# Patient Record
Sex: FEMALE | Race: WHITE | Employment: UNEMPLOYED | URBAN - METROPOLITAN AREA
[De-identification: names, ages, dates, MRNs, and addresses within clinical notes are randomized per-mention and may not be internally consistent; named-entity substitution may affect disease eponyms.]

---

## 2017-01-19 ENCOUNTER — HOSPITAL ENCOUNTER (OUTPATIENT)
Dept: MAMMOGRAPHY | Age: 52
Discharge: HOME | End: 2017-01-19
Attending: FAMILY MEDICINE
Payer: OTHER GOVERNMENT

## 2017-01-19 DIAGNOSIS — Z12.31 VISIT FOR SCREENING MAMMOGRAM: ICD-10-CM

## 2017-01-19 PROCEDURE — 77067 SCR MAMMO BI INCL CAD: CPT

## 2017-01-20 ENCOUNTER — OFFICE VISIT (OUTPATIENT)
Dept: VASCULAR SURGERY | Age: 52
End: 2017-01-20

## 2017-01-20 VITALS
SYSTOLIC BLOOD PRESSURE: 124 MMHG | BODY MASS INDEX: 19.49 KG/M2 | HEIGHT: 63 IN | RESPIRATION RATE: 18 BRPM | HEART RATE: 76 BPM | WEIGHT: 110 LBS | DIASTOLIC BLOOD PRESSURE: 60 MMHG

## 2017-01-20 DIAGNOSIS — I83.899 VARICOSE VEINS WITH COMPLICATIONS: Primary | ICD-10-CM

## 2017-01-20 NOTE — PROGRESS NOTES
She is here for treatment of dilated progressively enlarging painful varicose veins. Her left leg as she stands throughout the day there is some branch varicosities of the saphenous and and vessel varicosities that become very dilated and purple also painful in nature. It alcohol prep to this area and a foamy sclera to the session of veins that are on the anterior tibial of the mid lower leg left side.   She tolerated this very nicely had a good result will see her back in follow-up if she has any further of these veins were can inject them as well

## 2017-01-20 NOTE — MR AVS SNAPSHOT
Visit Information Date & Time Provider Department Dept. Phone Encounter #  
 1/20/2017 10:30 AM Papito Reynolds, [address] [facility] [phone] [id number] Upcoming Health Maintenance Date Due Hepatitis C Screening 1965 DTaP/Tdap/Td series (1 - Tdap) 3/2/1984 FOBT Q 1 YEAR AGE 50-75 3/2/2013 INFLUENZA AGE 9 TO ADULT 8/1/2016 PAP AKA CERVICAL CYTOLOGY 12/8/2017 BREAST CANCER SCRN MAMMOGRAM 1/18/2018 Allergies as of 1/20/2017  Review Complete On: 1/20/2017 By: Papito Reynolds MD  
  
 Severity Noted Reaction Type Reactions Sulfa Dyne  09/24/2012    Rash, Itching Current Immunizations  Never Reviewed No immunizations on file. Not reviewed this visit You Were Diagnosed With   
  
 Codes Comments Varicose veins with complications    -  Primary ICD-10-CM: V81.291 ICD-9-CM: 454.8 Vitals BP Pulse Resp Height(growth percentile) Weight(growth percentile) BMI  
 124/60 (BP 1 Location: Left arm, BP Patient Position: Sitting) 76 18 5' 3\" (1.6 m) 110 lb (49.9 kg) 19.49 kg/m2 OB Status Smoking Status Menopause Former Smoker BMI and BSA Data Body Mass Index Body Surface Area  
 19.49 kg/m 2 1.49 m 2 Your Updated Medication List  
  
   
This list is accurate as of: 1/20/17  1:12 PM.  Always use your most recent med list.  
  
  
  
  
 ADDERALL 10 mg tablet Generic drug:  dextroamphetamine-amphetamine Take  by mouth. ADVIL 100 mg tablet Generic drug:  ibuprofen Take  by mouth. folic acid 1 mg tablet Commonly known as:  [employer] Take  by mouth daily. HUMIRA 40 mg/0.8 mL injection Generic drug:  adalimumab  
by SubCUTAneous route once. METHOTREXATE SODIUM PO Take  by mouth. oxyCODONE-acetaminophen 5-325 mg per tablet Commonly known as:  PERCOCET Take 1-2 Tabs by mouth every four (4) hours as needed. Max Daily Amount: 12 Tabs. predniSONE 5 mg tablet Commonly known as:  Isabel Gelineau Take  by mouth. venlafaxine 100 mg tablet Commonly known as:  [facility] Take  by mouth.  
  
 zolpidem 5 mg tablet Commonly known as:  AMBIEN Take  by mouth nightly as needed for Sleep. We Performed the Following INJECTION THERAPY VEIN,MULT VEINS [89166 CPT(R)] Please provide this summary of care documentation to your next provider. Your primary care clinician is listed as Tamela Chew. If you have any questions after today's visit, please call [phone].

## 2017-02-14 ENCOUNTER — OFFICE VISIT (OUTPATIENT)
Dept: VASCULAR SURGERY | Age: 52
End: 2017-02-14

## 2017-02-14 VITALS
DIASTOLIC BLOOD PRESSURE: 80 MMHG | HEIGHT: 63 IN | HEART RATE: 80 BPM | SYSTOLIC BLOOD PRESSURE: 120 MMHG | RESPIRATION RATE: 22 BRPM | BODY MASS INDEX: 19.49 KG/M2 | WEIGHT: 110 LBS

## 2017-02-14 DIAGNOSIS — I83.899 VARICOSE VEINS WITH COMPLICATIONS: Primary | ICD-10-CM

## 2017-02-14 DIAGNOSIS — I83.811 VARICOSE VEINS WITH PAIN, RIGHT: ICD-10-CM

## 2017-02-14 RX ORDER — ERGOCALCIFEROL 1.25 MG/1
CAPSULE ORAL
Refills: 0 | COMMUNITY
Start: 2017-01-30 | End: 2017-11-01

## 2017-02-14 RX ORDER — POTASSIUM CHLORIDE 750 MG/1
TABLET, EXTENDED RELEASE ORAL
Refills: 0 | COMMUNITY
Start: 2017-01-20 | End: 2017-11-01 | Stop reason: DRUGHIGH

## 2017-02-14 NOTE — MR AVS SNAPSHOT
Visit Information Date & Time Provider Department Dept. Phone Encounter #  
 2/14/2017  9:45 AM Chris Carreon, [place] [phone] [id number] Your Appointments 3/28/2017 11:30 AM  
INJECTION with MD DEBRA Gonzalez [facility] ([facility]) Appt Note: Injection [address] [address]  
[phone]  
  
   
 [address] [address] 4/18/2017  9:45 AM  
INJECTION with MD DEBRA Gonzalez [facility] ([facility]) Appt Note: injection [address] Upcoming Health Maintenance Date Due Hepatitis C Screening 1965 DTaP/Tdap/Td series (1 - Tdap) 3/2/1984 FOBT Q 1 YEAR AGE 50-75 3/2/2013 INFLUENZA AGE 9 TO ADULT 8/1/2016 PAP AKA CERVICAL CYTOLOGY 12/8/2017 BREAST CANCER SCRN MAMMOGRAM 1/19/2019 Allergies as of 2/14/2017  Review Complete On: 2/14/2017 By: Chris Carreon MD  
  
 Severity Noted Reaction Type Reactions Sulfa Dyne  09/24/2012    Rash, Itching Current Immunizations  Never Reviewed No immunizations on file. Not reviewed this visit You Were Diagnosed With   
  
 Codes Comments Varicose veins with complications    -  Primary ICD-10-CM: T62.217 ICD-9-CM: 454.8 Varicose veins with pain, right     ICD-10-CM: W49.963 ICD-9-CM: 454.8 Vitals BP Pulse Resp Height(growth percentile) Weight(growth percentile) BMI  
 120/80 (BP 1 Location: Left arm, BP Patient Position: Sitting) 80 22 5' 3\" (1.6 m) 110 lb (49.9 kg) 19.49 kg/m2 OB Status Smoking Status Menopause Former Smoker Vitals History BMI and BSA Data Body Mass Index Body Surface Area  
 19.49 kg/m 2 1.49 m 2 Your Updated Medication List  
  
   
This list is accurate as of: 2/14/17 10:53 AM.  Always use your most recent med list.  
  
  
  
 ADDERALL 10 mg tablet Generic drug:  dextroamphetamine-amphetamine Take  by mouth. ADVIL 100 mg tablet Generic drug:  ibuprofen Take  by mouth. folic acid 1 mg tablet Commonly known as:  [employer] Take  by mouth daily. HUMIRA 40 mg/0.8 mL injection Generic drug:  adalimumab  
by SubCUTAneous route once. METHOTREXATE SODIUM PO Take  by mouth. oxyCODONE-acetaminophen 5-325 mg per tablet Commonly known as:  PERCOCET Take 1-2 Tabs by mouth every four (4) hours as needed. Max Daily Amount: 12 Tabs. potassium chloride 10 mEq tablet Commonly known as:  K-DUR, KLOR-CON  
take 1 tablet by mouth once daily ONLY WHEN TAKING FUROSEMIDE  
  
 predniSONE 5 mg tablet Commonly known as:  [facility] Take  by mouth. venlafaxine 100 mg tablet Commonly known as:  [facility] Take  by mouth. VITAMIN D2 50,000 unit capsule Generic drug:  ergocalciferol  
take 1 capsule by mouth every week  
  
 zolpidem 5 mg tablet Commonly known as:  AMBIEN Take  by mouth nightly as needed for Sleep. We Performed the Following INJECTION THERAPY VEIN,MULT VEINS [89730 CPT(R)] Please provide this summary of care documentation to your next provider. Your primary care clinician is listed as Amparo Gregorio. If you have any questions after today's visit, please call [phone].

## 2017-02-14 NOTE — PROGRESS NOTES
She is happy with her injection sclerotherapy she has had to point. He shows me to varicose veins on the right leg one on the inner calf that is an obvious branch varicosity of the saphenous quite dilated and painful to her also a second one on the lateral leg that goes from the lateral calf to crossing the knees become prominent and painful as well as an alcohol prep of both the sites and injected a foamy a sclera appeared to have a nice response. She will follow-up there is several other large branch varicosities on the medial calf like to follow-up for injection as well this is all right leg.

## 2017-03-08 ENCOUNTER — HOSPITAL ENCOUNTER (OUTPATIENT)
Dept: PHYSICAL THERAPY | Age: 52
Discharge: HOME | End: 2017-03-08
Payer: OTHER GOVERNMENT

## 2017-03-08 PROCEDURE — 97016 VASOPNEUMATIC DEVICE THERAPY: CPT

## 2017-03-08 PROCEDURE — 97162 PT EVAL MOD COMPLEX 30 MIN: CPT

## 2017-03-08 PROCEDURE — 97110 THERAPEUTIC EXERCISES: CPT

## 2017-03-08 NOTE — PROGRESS NOTES
PT SHOULDER EVAL AND TREATMENT      Patient Name: Willie Pablo  Date:3/8/2017  : 1965  [x]  Patient  Verified  Payor: KEANU / Plan: Debo Stephen DEPENDENTS / Product Type: Sharri Sanchez /    In time:910  Out time: 1000  Total Treatment Time (min): 50  Visit #: 1 of     Treatment Area: Bilateral shoulder pain [M25.511, M25.512]    SUBJECTIVE  Pain Level (0-10 scale):  (C): 5 (B): 2 (W):  10  Any medication changes, allergies to medications, diagnosis change, or new procedure performed: see summary sheet for update  Subjective functional status/changes:  CHIEF COMPLAINT:  Patient reports with co B shoulder pain and popping, L greater than R starting Oct/2016 insidious onset. Patient had cortisone shots in B shoulder in Dec 2016 with short term relief. Patient had xray of unknown results. Patient has significant  hx of 3x CS surgeries/ fusion (3/2011,2012, 2015)  osteoporosis and RA. Patient is right hand dominant. DEFICITS: ADLS/ putting on deodarant, doing hair, reaching overhead, upper body dressing,  intermittent sleep disruption, driving   OBJECTIVE:   Posture: severe rounded shoulder and forward head  CS ROM - decreased 75% throughout     ROM:                                             AROM                                                              PROM   Left Right  Left Right   Flexion 62 170 Flexion WFL WNL   Extension 50 80 Extension     Scaption/ABD 30 170 Scaptin/ABD     ER @ 0 Degrees 45 60 ER @ 0 Degrees     IR/ADD L5 T8 IR/ADD       Strength:                                                                         L (1-5) R (1-5) Pain   Flexors NT sec to pain on resistnace 3+ _ Yes   [] No   Abductors  3+ [] Yes   [] No   External Rotators  3+ [] Yes   [] No   Internal Rotators  3+ [] Yes   [] No    strength :  Decreased L     Scapulohumoral Control / Rhythm:  Able to eccentrically lower with good control?  Left: [x] Yes   [] No     Right: [x] Yes   [] No    Palpation  Denies tenderness to touch     Other Tests / Comments:   + crossover imp    Manual: NT    Modality (rationale): decrease pain/ inflammation   [x]  VASO 10  Min L shoulder in seated - mod compression, min temp     Patient Education: [x] Established HEP    [x] POT  (minutes) : 8    Pain Level (0-10 scale) post treatment: 4    ASSESSMENT  [x]  See Plan of Care    PLAN  [x]  Upgrade activities as tolerated  [x] Other:_POC   2x8-12    Eligio Man, PT 3/8/2017  6:27 AM    Justification for Eval Code Complexity:  Patient History : CS fusion, severely poor posture   Examination see exam as above   Clinical Presentation: evolving sec to multi system involvement   Clinical Decision Making : FOTO 46/100

## 2017-03-08 NOTE — PROGRESS NOTES
[address] [facility] AT [address] [place], [place]  Phone: [phone] Fax: [phone] / STATEMENT OF MEDICAL NECESSITY FOR PHYSICAL THERAPY SERVICES  Patient Name: Yohana Murrell : 1965   Medical   Diagnosis: Bilateral shoulder pain [M25.511, M25.512] Treatment Diagnosis: B shoulder pain, postural dysfunction/ impingement    Onset Date: Oct 2016     Referral Source: SONA Lind Start of Care [facility]): 3/8/2017   Prior Hospitalization: See medical history Provider #: [id number]   Prior Level of Function: Functional I - mult co morbidities    Comorbidities: CS fusion x 3, osteoporosis, arthritis, anemia   Medications: Verified on Patient Summary List   The Plan of Care and following information is based on the information from the initial evaluation.   ========================================================================  Assessment / key information:  Pt is a 45y.o. year old female who presents with co B shoulder pain and popping, L greater than R starting Oct/2016 insidious onset. Patient had cortisone shots in B shoulder in Dec 2016 with short term relief. Patient had xray of unknown results. Patient has significant hx of 3x CS surgeries/ fusion (3/2011,2012, 2015) osteoporosis and RA. Patient is right hand dominant. Current deficits include: increased pain to 10/10 at worst, severely poor posture with rounded shoulders and fwd head, poor postural strength sec to inc TS kyphosis, CS ROM limitation by grossly 75%,  Shoulder AROM decreased as below (PROM WFL), strength NT sec to pain on resistance for L , R grossly 3+/5 and positive crossover impingement test. Functional deficits include: ADLS/ putting on deodarant, doing hair, reaching overhead, upper body dressing,  intermittent sleep disruption, driving. Home exercise program initiated on initial evaluation to address these deficits.  Pt would benefit from PT to address these deficits for increased functional mobility and QOL. Left Right   Flexion 62 170   Extension 50 80   Scaption/ABD 30 170   ER @ 0 Degrees 45 60   IR/ADD L5 T8     ========================================================================  Eval Complexity: History: MEDIUM  Complexity : 1-2 comorbidities / personal factors will impact the outcome/ POC Exam:HIGH Complexity : 4+ Standardized tests and measures addressing body structure, function, activity limitation and / or participation in recreation  Presentation: MEDIUM Complexity : Evolving with changing characteristics  Clinical Decision Making:MEDIUM Complexity : FOTO score of 26-74Overall Complexity:MEDIUM  Problem List: pain affecting function, decrease ROM, decrease strength, decrease ADL/ functional abilitiies, decrease activity tolerance, decrease flexibility/ joint mobility and other FOTO 46/100   Treatment Plan may include any combination of the following: Therapeutic exercise, Therapeutic activities, Neuromuscular re-education, Physical agent/modality, Gait/balance training, Manual therapy, Aquatic therapy and Patient education  Patient / Family readiness to learn indicated by: asking questions, trying to perform skills and interest  Persons(s) to be included in education: patient (P)  Barriers to Learning/Limitations: None  Measures taken:    Patient Goal (s): \"be able to use my arm \"   Patient self reported health status: good  Rehabilitation Potential: good   Short Term Goals: To be accomplished in  1  weeks:  1. Pt will be independent and compliant with HEP   Long Term Goals: To be accomplished in  8-12  treatments:  1. Patient will increase FOTO score to 65/100 for indications of increased functional mobility. 2.  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 Patient will demo increased L strength to 3/5 shoulder flexion for progression of lifting to OH shelves  4.  Patient will demo negative crossover impingement sign to indicate decreased impingement of RTC with UE activities such as driving   Frequency / Duration:   Patient to be seen  2  times per week for 8-12  treatments:  Patient / Caregiver education and instruction: self care, activity modification and exercises  G-Codes (GP): ZOEY  Therapist Signature: Rodrigue Palmer PT Date: 7/2/8626   Certification Period: NA Time: 6:27 AM   ========================================================================  I certify that the above Physical Therapy Services are being furnished while the patient is under my care. I agree with the treatment plan and certify that this therapy is necessary. Physician Signature:        Date:       Time:   Please sign and return to [facility] or you may fax the signed copy to [phone]. Thank you.

## 2017-03-14 ENCOUNTER — HOSPITAL ENCOUNTER (OUTPATIENT)
Dept: PHYSICAL THERAPY | Age: 52
Discharge: HOME | End: 2017-03-14
Payer: OTHER GOVERNMENT

## 2017-03-14 PROCEDURE — 97110 THERAPEUTIC EXERCISES: CPT | Performed by: PHYSICAL THERAPIST

## 2017-03-14 PROCEDURE — 97016 VASOPNEUMATIC DEVICE THERAPY: CPT | Performed by: PHYSICAL THERAPIST

## 2017-03-14 NOTE — PROGRESS NOTES
PT DAILY TREATMENT NOTE     Patient Name: Bekah Peeling  Date:3/14/2017  : 1965  [x]  Patient  Verified  Payor:  / Plan: [address] DEPENDENTS / Product Type: Taj Hernandez /    In time:1135am  Out time:12:26  Total Treatment Time (min): 51  Total Timed Codes (min): 51  1:1 Treatment Time (min):    Visit #: 2 of     Treatment Area: Bilateral shoulder pain [M25.511, M25.512]    SUBJECTIVE  Pain Level (0-10 scale): 2 achy   Any medication changes, allergies to medications, adverse drug reactions, diagnosis change, or new procedure performed?: [x] No    [] Yes (see summary sheet for update)  Subjective functional status/changes:   [] No changes reported  \"I go back to [facility] on Thursday for FU, because I am off my steroids. \"    OBJECTIVE  Modality rationale: decrease inflammation, decrease pain and increase tissue extensibility to improve the patients ability to perform functional mobility and improve activity  endurance     Min Type Additional Details    [] Estim: []Att   []Unatt        []TENS instruct                  []IFC  []Premod   []NMES                     []Other:  []w/US   []w/ice   []w/heat  Position:  Location:    []  Traction: [] Cervical       []Lumbar                       [] Prone          []Supine                       []Intermittent   []Continuous Lbs:  [] before manual  [] after manual    []  Ultrasound: []Continuous   [] Pulsed                           []1MHz   []3MHz Location:  W/cm2:    []  Iontophoresis with dexamethasone         Location: [] Take home patch   [] In clinic    []  Ice     []  heat  []  Ice massage Position:  Location:   10 [x]  Vasopneumatic Device Pressure:       [] lo [x] med [] hi   Temperature: [x] lo [] med [] hi   [] Skin assessment post-treatment:  []intact []redness- no adverse reaction       []redness - adverse reaction:       51 min Therapeutic Exercise:  [] See flow sheet :   Rationale: increase ROM, increase strength and increase proprioception to improve the patients ability to lift and perform OH reaching               min Patient Education: [x] Review HEP    [] Progressed/Changed HEP based on:   [] positioning   [] body mechanics   [] transfers   [] heat/ice application        Other Objective/Functional Measures: full AAROM into flexion/scaption with no pain on pulleys    Pain Level (0-10 scale) post treatment: 0    ASSESSMENT/Changes in Function: Patient tolerated initiation of Therex program with mild C/o pain into full flexion/scaption of L shoulder with PROM, however pain produced with AROM flexion/scaption. Patient will continue to benefit from skilled PT services to modify and progress therapeutic interventions, address functional mobility deficits, address ROM deficits, address strength deficits, analyze and address soft tissue restrictions, analyze and cue movement patterns, analyze and modify body mechanics/ergonomics and assess and modify postural abnormalities to attain remaining goals. []  See Plan of Care  []  See progress note/recertification  []  See Discharge Summary         Progress towards goals / Updated goals: · Short Term Goals: To be accomplished in 1 weeks:  1. Pt will be independent and compliant with HEP  · Long Term Goals: To be accomplished in 8-12 treatments:  1. Patient will increase FOTO score to 65/100 for indications of increased functional mobility. 2. 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 Patient will demo increased L strength to 3/5 shoulder flexion for progression of lifting to OH shelves  4.  Patient will demo negative crossover impingement sign to indicate decreased impingement of RTC with UE activities such as driving     PLAN  [x]  Upgrade activities as tolerated     [x]  Continue plan of care  []  Update interventions per flow sheet       []  Discharge due to:_  []  Other:_      Luda Meyer, PT 3/14/2017  10:09 AM

## 2017-03-17 ENCOUNTER — HOSPITAL ENCOUNTER (OUTPATIENT)
Dept: PHYSICAL THERAPY | Age: 52
Discharge: HOME | End: 2017-03-17
Payer: OTHER GOVERNMENT

## 2017-03-17 PROCEDURE — 97110 THERAPEUTIC EXERCISES: CPT | Performed by: PHYSICAL THERAPIST

## 2017-03-17 NOTE — PROGRESS NOTES
PT DAILY TREATMENT NOTE     Patient Name: Julio Byrd  Date:3/17/2017  : 1965  [x]  Patient  Verified  Payor:  / Plan: Tom Noguera DEPENDENTS / Product Type:  /    In time:800 am  Out time:850  Total Treatment Time (min): 50  Total Timed Codes (min): 40  1:1 Treatment Time (min):    Visit #: 3 of     Treatment Area: Bilateral shoulder pain [M25.511, M25.512]    SUBJECTIVE  Pain Level (0-10 scale): 1-4 achy   Any medication changes, allergies to medications, adverse drug reactions, diagnosis change, or new procedure performed?: [x] No    [] Yes (see summary sheet for update)  Subjective functional status/changes:   [] No changes reported   \" I saw my doctor and she is going to put me on Orencia. \"    OBJECTIVE  Modality rationale: decrease inflammation, decrease pain and increase tissue extensibility to improve the patients ability to perform functional mobility and improve activity  endurance     Min Type Additional Details    [] Estim: []Att   []Unatt        []TENS instruct                  []IFC  []Premod   []NMES                     []Other:  []w/US   []w/ice   []w/heat  Position:  Location:    []  Traction: [] Cervical       []Lumbar                       [] Prone          []Supine                       []Intermittent   []Continuous Lbs:  [] before manual  [] after manual    []  Ultrasound: []Continuous   [] Pulsed                           []1MHz   []3MHz Location:  W/cm2:    []  Iontophoresis with dexamethasone         Location: [] Take home patch   [] In clinic   10 [x]  Ice     []  heat  []  Ice massage Position:  Location:   Someone using [x]  Vasopneumatic Device Pressure:       [] lo [x] med [] hi   Temperature: [x] lo [] med [] hi   [] Skin assessment post-treatment:  []intact []redness- no adverse reaction       []redness - adverse reaction:       40 min Therapeutic Exercise:  [] See flow sheet : added open book, and1i/2 prone letters.     Rationale: increase ROM, increase strength and increase proprioception to improve the patients ability to lift and perform OH reaching               min Patient Education: [x] Review HEP    [] Progressed/Changed HEP based on:   [] positioning   [] body mechanics   [] transfers   [] heat/ice application        Other Objective/Functional Measures:  AROM L shoulder flexion 90, 75 deg scaption with pain, decreased scapulo-humeral rhythm with L UE, pain with lowering and painful arc. Pain Level (0-10 scale) post treatment: 0    ASSESSMENT/Changes in Function: Patient reports R, elbow pain from RA, tolerated addition of open book and 1/2 standing prone M, T's with good tolerance. MD is going to start her on once a week Orencia injection for RA instead of steroids. Patient will continue to benefit from skilled PT services to modify and progress therapeutic interventions, address functional mobility deficits, address ROM deficits, address strength deficits, analyze and address soft tissue restrictions, analyze and cue movement patterns, analyze and modify body mechanics/ergonomics and assess and modify postural abnormalities to attain remaining goals. []  See Plan of Care  []  See progress note/recertification  []  See Discharge Summary         Progress towards goals / Updated goals: · Short Term Goals: To be accomplished in 1 weeks:  1. Pt will be independent and compliant with HEP  · Long Term Goals: To be accomplished in 8-12 treatments:  1. Patient will increase FOTO score to 65/100 for indications of increased functional mobility. 2. 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 Patient will demo increased L strength to 3/5 shoulder flexion for progression of lifting to OH shelves  4.  Patient will demo negative crossover impingement sign to indicate decreased impingement of RTC with UE activities such as driving     PLAN  [x]  Upgrade activities as tolerated [x]  Continue plan of care  []  Update interventions per flow sheet       []  Discharge due to:_  []  Other:_      Jillian Day, PT 3/17/2017  10:09 AM

## 2017-03-21 ENCOUNTER — HOSPITAL ENCOUNTER (OUTPATIENT)
Dept: PHYSICAL THERAPY | Age: 52
Discharge: HOME | End: 2017-03-21
Payer: OTHER GOVERNMENT

## 2017-03-21 PROCEDURE — 97110 THERAPEUTIC EXERCISES: CPT | Performed by: PHYSICAL THERAPIST

## 2017-03-21 PROCEDURE — 97016 VASOPNEUMATIC DEVICE THERAPY: CPT | Performed by: PHYSICAL THERAPIST

## 2017-03-21 NOTE — PROGRESS NOTES
PT DAILY TREATMENT NOTE     Patient Name: Darlin Stoner  Date:3/21/2017  : 1965  [x]  Patient  Verified  Payor:  / Plan: [facility]  RETIREES AND DEPENDENTS / Product Type:  /    In time: 305 pm  Out time: 413 pm  Total Treatment Time (min): 68  Total Timed Codes (min): 68  1:1 Treatment Time (min):    Visit #: 4 of     Treatment Area: Bilateral shoulder pain [M25.511, M25.512]    SUBJECTIVE  Pain Level (0-10 scale): 1-4   Any medication changes, allergies to medications, adverse drug reactions, diagnosis change, or new procedure performed?: [x] No    [] Yes (see summary sheet for update)  Subjective functional status/changes:   [] No changes reported   \" My R ankle and foot have greer swollen and painful. \"    OBJECTIVE  Modality rationale: decrease inflammation, decrease pain and increase tissue extensibility to improve the patients ability to perform functional mobility and improve activity  endurance     Min Type Additional Details    [] Estim: []Att   []Unatt        []TENS instruct                  []IFC  []Premod   []NMES                     []Other:  []w/US   []w/ice   []w/heat  Position:  Location:    []  Traction: [] Cervical       []Lumbar                       [] Prone          []Supine                       []Intermittent   []Continuous Lbs:  [] before manual  [] after manual    []  Ultrasound: []Continuous   [] Pulsed                           []1MHz   []3MHz Location:  W/cm2:    []  Iontophoresis with dexamethasone         Location: [] Take home patch   [] In clinic    [x]  Ice     []  heat  []  Ice massage Position:  Location:   10 [x]  Vasopneumatic Device Pressure:       [] lo [x] med [] hi   Temperature: [x] lo [] med [] hi   [] Skin assessment post-treatment:  []intact []redness- no adverse reaction       []redness - adverse reaction:       58 min Therapeutic Exercise:  [] See flow sheet : added open book, and1i/2 prone letters.     Rationale: increase ROM, increase strength and increase proprioception to improve the patients ability to lift and perform OH reaching             min Patient Education: [x] Review HEP    [] Progressed/Changed HEP based on:   [] positioning   [] body mechanics   [] transfers   [] heat/ice application        Other Objective/Functional Measures:  AROM L shoulder flexion 95, 80 deg scaption with pain at end range, Progressed to Green Tb for shoulder 5 way. Pain Level (0-10 scale) post treatment: 3    ASSESSMENT/Changes in Function: Patient reports increase in R foot pain and swelling due to RA. Patient continues to present with extremely fwd rounded shoulders, decreased R elbow extension into end range on R. Patient will continue to benefit from skilled PT services to modify and progress therapeutic interventions, address functional mobility deficits, address ROM deficits, address strength deficits, analyze and address soft tissue restrictions, analyze and cue movement patterns, analyze and modify body mechanics/ergonomics and assess and modify postural abnormalities to attain remaining goals. []  See Plan of Care  []  See progress note/recertification  []  See Discharge Summary         Progress towards goals / Updated goals: · Short Term Goals: To be accomplished in 1 weeks:  1. Pt will be independent and compliant with HEP  · Long Term Goals: To be accomplished in 8-12 treatments:  1. Patient will increase FOTO score to 65/100 for indications of increased functional mobility. 2. 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 Patient will demo increased L strength to 3/5 shoulder flexion for progression of lifting to OH shelves  4.  Patient will demo negative crossover impingement sign to indicate decreased impingement of RTC with UE activities such as driving     PLAN  [x]  Upgrade activities as tolerated     [x]  Continue plan of care  []  Update interventions per flow sheet       [] Discharge due to:_  []  Other:_      Toya Kapadia, PT 3/21/2017  10:09 AM

## 2017-03-23 ENCOUNTER — HOSPITAL ENCOUNTER (OUTPATIENT)
Dept: PHYSICAL THERAPY | Age: 52
Discharge: HOME | End: 2017-03-23
Payer: OTHER GOVERNMENT

## 2017-03-23 PROCEDURE — 97016 VASOPNEUMATIC DEVICE THERAPY: CPT | Performed by: PHYSICAL THERAPIST

## 2017-03-23 PROCEDURE — 97110 THERAPEUTIC EXERCISES: CPT | Performed by: PHYSICAL THERAPIST

## 2017-03-23 NOTE — PROGRESS NOTES
PT DAILY TREATMENT NOTE     Patient Name: Errol Chan  Date:3/23/2017  : 1965  [x]  Patient  Verified  Payor:  / Plan: [facility]  RETIREES AND DEPENDENTS / Product Type:  /    In time: 200 pm  Out time: 306 pm  Total Treatment Time (min): 66  Total Timed Codes (min): 66  1:1 Treatment Time (min):    Visit #: 4 of     Treatment Area: Bilateral shoulder pain [M25.511, M25.512]    SUBJECTIVE  Pain Level (0-10 scale): 1  Any medication changes, allergies to medications, adverse drug reactions, diagnosis change, or new procedure performed?: [x] No    [] Yes (see summary sheet for update)  Subjective functional status/changes:   [] No changes reported   \" My shoulder feels ok today  \"    OBJECTIVE  Modality rationale: decrease inflammation, decrease pain and increase tissue extensibility to improve the patients ability to perform functional mobility and improve activity  endurance     Min Type Additional Details    [] Estim: []Att   []Unatt        []TENS instruct                  []IFC  []Premod   []NMES                     []Other:  []w/US   []w/ice   []w/heat  Position:  Location:    []  Traction: [] Cervical       []Lumbar                       [] Prone          []Supine                       []Intermittent   []Continuous Lbs:  [] before manual  [] after manual    []  Ultrasound: []Continuous   [] Pulsed                           []1MHz   []3MHz Location:  W/cm2:    []  Iontophoresis with dexamethasone         Location: [] Take home patch   [] In clinic    [x]  Ice     []  heat  []  Ice massage Position:  Location:   10 [x]  Vasopneumatic Device Pressure:       [] lo [x] med [] hi   Temperature: [x] lo [] med [] hi   [] Skin assessment post-treatment:  []intact []redness- no adverse reaction       []redness - adverse reaction:       56 min Therapeutic Exercise:  [] See flow sheet : added open book, and1i/2 prone letters.     Rationale: increase ROM, increase strength and increase proprioception to improve the patients ability to lift and perform OH reaching             min Patient Education: [x] Review HEP    [] Progressed/Changed HEP based on:   [] positioning   [] body mechanics   [] transfers   [] heat/ice application        Other Objective/Functional Measures:  See flow sheet, added shoulder rolls due to patients fwd posture and c/o numbness in UE., held doorway stretch as pt reports pain last treatment in shoulder    Pain Level (0-10 scale) post treatment: 0    ASSESSMENT/Changes in Function:  Patient continues to present with extremely fwd rounded shoulders with fwd head, added some postural activities to treatment. Patient will continue to benefit from skilled PT services to modify and progress therapeutic interventions, address functional mobility deficits, address ROM deficits, address strength deficits, analyze and address soft tissue restrictions, analyze and cue movement patterns, analyze and modify body mechanics/ergonomics and assess and modify postural abnormalities to attain remaining goals. []  See Plan of Care  []  See progress note/recertification  []  See Discharge Summary         Progress towards goals / Updated goals: · Short Term Goals: To be accomplished in 1 weeks:  1. Pt will be independent and compliant with HEP  · Long Term Goals: To be accomplished in 8-12 treatments:  1. Patient will increase FOTO score to 65/100 for indications of increased functional mobility. 2. 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 Patient will demo increased L strength to 3/5 shoulder flexion for progression of lifting to OH shelves  4.  Patient will demo negative crossover impingement sign to indicate decreased impingement of RTC with UE activities such as driving     PLAN  [x]  Upgrade activities as tolerated     [x]  Continue plan of care  []  Update interventions per flow sheet       []  Discharge due to:_  [] Other:_      Dwight Barrett, PT 3/23/2017  10:09 AM

## 2017-03-28 ENCOUNTER — HOSPITAL ENCOUNTER (OUTPATIENT)
Dept: PHYSICAL THERAPY | Age: 52
Discharge: HOME | End: 2017-03-28
Payer: OTHER GOVERNMENT

## 2017-03-28 ENCOUNTER — OFFICE VISIT (OUTPATIENT)
Dept: VASCULAR SURGERY | Age: 52
End: 2017-03-28

## 2017-03-28 VITALS
HEART RATE: 60 BPM | DIASTOLIC BLOOD PRESSURE: 64 MMHG | WEIGHT: 110 LBS | SYSTOLIC BLOOD PRESSURE: 102 MMHG | BODY MASS INDEX: 19.49 KG/M2 | HEIGHT: 63 IN

## 2017-03-28 DIAGNOSIS — I83.899 VARICOSE VEINS WITH COMPLICATIONS: Primary | ICD-10-CM

## 2017-03-28 DIAGNOSIS — I87.2 SAPHENOFEMORAL VENOUS REFLUX: ICD-10-CM

## 2017-03-28 PROCEDURE — 97016 VASOPNEUMATIC DEVICE THERAPY: CPT | Performed by: PHYSICAL THERAPIST

## 2017-03-28 PROCEDURE — 97110 THERAPEUTIC EXERCISES: CPT | Performed by: PHYSICAL THERAPIST

## 2017-03-28 PROCEDURE — 97140 MANUAL THERAPY 1/> REGIONS: CPT | Performed by: PHYSICAL THERAPIST

## 2017-03-28 NOTE — MR AVS SNAPSHOT
Visit Information Date & Time Provider Department Dept. Phone Encounter #  
 3/28/2017 11:30 AM Xavier Rogers, [facility] [phone] [id number] Your Appointments 4/18/2017  9:45 AM  
INJECTION with Xavier Rogers MD  
 [facility] (FLY SCHEDULING) Appt Note: injection [address] [address]  
[phone]  
  
   
 [address] [address] Upcoming Health Maintenance Date Due Hepatitis C Screening 1965 DTaP/Tdap/Td series (1 - Tdap) 3/2/1984 FOBT Q 1 YEAR AGE 50-75 3/2/2013 INFLUENZA AGE 9 TO ADULT 8/1/2016 PAP AKA CERVICAL CYTOLOGY 12/8/2017 BREAST CANCER SCRN MAMMOGRAM 1/19/2019 Allergies as of 3/28/2017  Review Complete On: 3/28/2017 By: Xavier Rogers MD  
  
 Severity Noted Reaction Type Reactions Sulfa Dyne  09/24/2012    Rash, Itching Current Immunizations  Never Reviewed No immunizations on file. Not reviewed this visit You Were Diagnosed With   
  
 Codes Comments Varicose veins with complications    -  Primary ICD-10-CM: Y17.254 ICD-9-CM: 454.8 Saphenofemoral venous reflux     ICD-10-CM: I87.2 ICD-9-CM: 459.81 Vitals BP Pulse Height(growth percentile) Weight(growth percentile) BMI OB Status 102/64 (BP 1 Location: Left arm, BP Patient Position: Sitting) 60 5' 3\" (1.6 m) 110 lb (49.9 kg) 19.49 kg/m2 Menopause Smoking Status Former Smoker Vitals History BMI and BSA Data Body Mass Index Body Surface Area  
 19.49 kg/m 2 1.49 m 2 Your Updated Medication List  
  
   
This list is accurate as of: 3/28/17 12:40 PM.  Always use your most recent med list.  
  
  
  
  
 ADDERALL 10 mg tablet Generic drug:  dextroamphetamine-amphetamine Take  by mouth. ADVIL 100 mg tablet Generic drug:  ibuprofen Take  by mouth. folic acid 1 mg tablet Commonly known as:  [employer] Take  by mouth daily. HUMIRA 40 mg/0.8 mL injection Generic drug:  adalimumab  
by SubCUTAneous route once. METHOTREXATE SODIUM PO Take  by mouth. oxyCODONE-acetaminophen 5-325 mg per tablet Commonly known as:  PERCOCET Take 1-2 Tabs by mouth every four (4) hours as needed. Max Daily Amount: 12 Tabs. potassium chloride 10 mEq tablet Commonly known as:  K-DUR, KLOR-CON  
take 1 tablet by mouth once daily ONLY WHEN TAKING FUROSEMIDE  
  
 predniSONE 5 mg tablet Commonly known as:  Merle Thanh Take  by mouth. venlafaxine 100 mg tablet Commonly known as:  [facility] Take  by mouth. VITAMIN D2 50,000 unit capsule Generic drug:  ergocalciferol  
take 1 capsule by mouth every week  
  
 zolpidem 5 mg tablet Commonly known as:  AMBIEN Take  by mouth nightly as needed for Sleep. We Performed the Following INJECTION THERAPY VEIN,MULT VEINS [58569 CPT(R)] To-Do List   
 03/28/2017  2:00 PM  
  Appointment with [address] [facility] 1 at [facility] [address] ([phone])  
  
 03/30/2017 2:00 PM  
  Appointment with Supriya Alcantara, PT at [facility] [address] ([phone]) 04/04/2017 2:00 PM  
  Appointment with Sachin Merritt, PT at [facility] [address] ([phone]) 04/07/2017 11:00 AM  
  Appointment with [facility] 1 at [facility] [address] ([phone]) Please provide this summary of care documentation to your next provider. Your primary care clinician is listed as Franklin Estrella. If you have any questions after today's visit, please call [phone].

## 2017-03-28 NOTE — PROGRESS NOTES
Ms. Jose Medina is a 53-year-old female here for injection sclerotherapy today. She has a history of a saphenous vein ablation related to symptomatic reflux. She has some painful varicose veins she wants to focus on today especially behind the knee    Placed on exam table in supine position did an alcohol prep she has several dilated branch varicosities that are quite easy to see even in a supine position. Did injection of foamy Asclera which she tolerated very nicely and appears have a very nice result.   A light steroid cream is applied as well as a wrap she will follow-up for scleral as needed she has other sites also with branch varicosities she tells me she is happy with her progress with sclerotherapy

## 2017-03-28 NOTE — PROGRESS NOTES
PT DAILY TREATMENT NOTE     Patient Name: Pooja Lopez  Date:3/28/2017  : 1965  [x]  Patient  Verified  Payor:  / Plan: [facility] DEPENDENTS / Product Type:  /    In time: 155 pm  Out time: 300 pm  Total Treatment Time (min): 65  Total Timed Codes (min): 65  1:1 Treatment Time (min):    Visit #: 6 of     Treatment Area: Bilateral shoulder pain [M25.511, M25.512]    SUBJECTIVE  Pain Level (0-10 scale): 1-2 in shoulders but my R elbow is bothering me. Any medication changes, allergies to medications, adverse drug reactions, diagnosis change, or new procedure performed?: [x] No    [] Yes (see summary sheet for update)  Subjective functional status/changes:   [] No changes reported   \" I am not able to move my L shoulder across my body to my R arm pit, but I can move my R shoulder in that direction with no problems.   \"    OBJECTIVE  Modality rationale: decrease inflammation, decrease pain and increase tissue extensibility to improve the patients ability to perform functional mobility and improve activity  endurance     Min Type Additional Details    [] Estim: []Att   []Unatt        []TENS instruct                  []IFC  []Premod   []NMES                     []Other:  []w/US   []w/ice   []w/heat  Position:  Location:    []  Traction: [] Cervical       []Lumbar                       [] Prone          []Supine                       []Intermittent   []Continuous Lbs:  [] before manual  [] after manual    []  Ultrasound: []Continuous   [] Pulsed                           []1MHz   []3MHz Location:  W/cm2:    []  Iontophoresis with dexamethasone         Location: [] Take home patch   [] In clinic    [x]  Ice     []  heat  []  Ice massage Position:  Location:   10 [x]  Vasopneumatic Device Pressure:       [] lo [x] med [] hi   Temperature: [x] lo [] med [] hi   [] Skin assessment post-treatment:  []intact []redness- no adverse reaction       []redness - adverse reaction:   10 min Manual Therapy:  GH joint mobes (inferior and post gr III/IV), PROM, TrPR, MFR to L pect. Rationale: decrease pain, increase ROM, increase tissue extensibility and decrease trigger points to perform functional mobility and improve activity  endurance      45 min Therapeutic Exercise:  [] See flow sheet : added open book, and1i/2 prone letters. Rationale: increase ROM, increase strength and increase proprioception to improve the patients ability to lift and perform OH reaching             min Patient Education: [x] Review HEP    [] Progressed/Changed HEP based on:   [] positioning   [] body mechanics   [] transfers   [] heat/ice application        Other Objective/Functional Measures:   Significant  compensations with L shoulder IR as patient has decreased dissociation of [address] jt and scapula, VC/TC to activate mid and LT over UT compensations, painful arc noted with lowering L shoulder. Pain Level (0-10 scale) post treatment: 0    ASSESSMENT/Changes in Function:  Patient continues to present with extremely fwd rounded shoulders with fwd head, mild improvements noted following MT and some MFR to L pectoral mm. Patient will continue to benefit from skilled PT services to modify and progress therapeutic interventions, address functional mobility deficits, address ROM deficits, address strength deficits, analyze and address soft tissue restrictions, analyze and cue movement patterns, analyze and modify body mechanics/ergonomics and assess and modify postural abnormalities to attain remaining goals. []  See Plan of Care  []  See progress note/recertification  []  See Discharge Summary         Progress towards goals / Updated goals: · Short Term Goals: To be accomplished in 1 weeks:  1. Pt will be independent and compliant with HEP  · Long Term Goals: To be accomplished in 8-12 treatments:  1. Patient will increase FOTO score to 65/100 for indications of increased functional mobility.    2. 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 Patient will demo increased L strength to 3/5 shoulder flexion for progression of lifting to OH shelves  4.  Patient will demo negative crossover impingement sign to indicate decreased impingement of RTC with UE activities such as driving     PLAN  [x]  Upgrade activities as tolerated     [x]  Continue plan of care  []  Update interventions per flow sheet       []  Discharge due to:_  []  Other:_      Sally Silva, PT 3/28/2017  10:09 AM

## 2017-03-30 ENCOUNTER — HOSPITAL ENCOUNTER (OUTPATIENT)
Dept: PHYSICAL THERAPY | Age: 52
Discharge: HOME | End: 2017-03-30
Payer: OTHER GOVERNMENT

## 2017-03-30 PROCEDURE — 97016 VASOPNEUMATIC DEVICE THERAPY: CPT | Performed by: PHYSICAL THERAPIST

## 2017-03-30 PROCEDURE — 97110 THERAPEUTIC EXERCISES: CPT | Performed by: PHYSICAL THERAPIST

## 2017-03-30 PROCEDURE — 97140 MANUAL THERAPY 1/> REGIONS: CPT | Performed by: PHYSICAL THERAPIST

## 2017-03-30 NOTE — PROGRESS NOTES
PT DAILY TREATMENT NOTE     Patient Name: Ronnell Gutierrez  Date:3/30/2017  : 1965  [x]  Patient  Verified  Payor:  / Plan: [facility]  RETIREES AND DEPENDENTS / Product Type:  /    In time: 200 pm  Out time: 310 pm  Total Treatment Time (min): 70  Total Timed Codes (min): 70  1:1 Treatment Time (min):    Visit #: 7 of     Treatment Area: Bilateral shoulder pain [M25.511, M25.512]    SUBJECTIVE  Pain Level (0-10 scale): 4 constant  in shoulders   Any medication changes, allergies to medications, adverse drug reactions, diagnosis change, or new procedure performed?: [x] No    [] Yes (see summary sheet for update)  Subjective functional status/changes:   [] No changes reported   \" I woke up today and everything hurts. \"    OBJECTIVE  Modality rationale: decrease inflammation, decrease pain and increase tissue extensibility to improve the patients ability to perform functional mobility and improve activity  endurance     Min Type Additional Details    [] Estim: []Att   []Unatt        []TENS instruct                  []IFC  []Premod   []NMES                     []Other:  []w/US   []w/ice   []w/heat  Position:  Location:    []  Traction: [] Cervical       []Lumbar                       [] Prone          []Supine                       []Intermittent   []Continuous Lbs:  [] before manual  [] after manual    []  Ultrasound: []Continuous   [] Pulsed                           []1MHz   []3MHz Location:  W/cm2:    []  Iontophoresis with dexamethasone         Location: [] Take home patch   [] In clinic    [x]  Ice     []  heat  []  Ice massage Position:  Location:   15 [x]  Vasopneumatic Device Pressure:       [] lo [x] med [] hi   Temperature: [x] lo [] med [] hi   [] Skin assessment post-treatment:  []intact []redness- no adverse reaction       []redness - adverse reaction:   10 min Manual Therapy:  GH joint mobes (inferior and post gr III/IV), PROM, TrPR to subscap, MFR to L pect.    Rationale: decrease pain, increase ROM, increase tissue extensibility and decrease trigger points to perform functional mobility and improve activity  endurance      45 min Therapeutic Exercise:  [] See flow sheet :     Rationale: increase ROM, increase strength and increase proprioception to improve the patients ability to lift and perform OH reaching             min Patient Education: [x] Review HEP    [] Progressed/Changed HEP based on:   [] positioning   [] body mechanics   [] transfers   [] heat/ice application        Other Objective/Functional Measures:   Pinching/stabbing pain with UBE today in L shoulder, TrP R to L subscap with active release, improved IR noted following MT,  MFR to pect mm B, cogwheel motion noted with PROM IR/ER, increased resistance to GTB for IR/ER activities with challenge with ER. Pain Level (0-10 scale) post treatment:  1     ASSESSMENT/Changes in Function: continues with pain reaching across body with L shoulder, decreased End range L shoulder  Flexion and IR  with pain noted, continues with TC fro decreased UT substitutions. TC to activate mid and LT with exercise. Patient will continue to benefit from skilled PT services to modify and progress therapeutic interventions, address functional mobility deficits, address ROM deficits, address strength deficits, analyze and address soft tissue restrictions, analyze and cue movement patterns, analyze and modify body mechanics/ergonomics and assess and modify postural abnormalities to attain remaining goals. []  See Plan of Care  []  See progress note/recertification  []  See Discharge Summary         Progress towards goals / Updated goals: · Short Term Goals: To be accomplished in 1 weeks:  1. Pt will be independent and compliant with HEP  · Long Term Goals: To be accomplished in 8-12 treatments:  1. Patient will increase FOTO score to 65/100 for indications of increased functional mobility.    2. Patient will demo AROM L shoulder flexion to 100 deg for ease with doing her hair / grooming  MET 3-30-17  3. Patient will demo increased L strength to 3/5 shoulder flexion for progression of lifting to OH shelves 4-/5 flexion MET 3-30-17  4.  Patient will demo negative crossover impingement sign to indicate decreased impingement of RTC with UE activities such as driving     PLAN  [x]  Upgrade activities as tolerated     [x]  Continue plan of care  []  Update interventions per flow sheet       []  Discharge due to:_  []  Other:_      Sagar Bernstein, PT 3/30/2017  10:09 AM

## 2017-04-04 ENCOUNTER — HOSPITAL ENCOUNTER (OUTPATIENT)
Dept: PHYSICAL THERAPY | Age: 52
Discharge: HOME | End: 2017-04-04
Payer: OTHER GOVERNMENT

## 2017-04-04 PROCEDURE — 97110 THERAPEUTIC EXERCISES: CPT | Performed by: PHYSICAL THERAPIST

## 2017-04-04 PROCEDURE — 97016 VASOPNEUMATIC DEVICE THERAPY: CPT | Performed by: PHYSICAL THERAPIST

## 2017-04-04 PROCEDURE — 97140 MANUAL THERAPY 1/> REGIONS: CPT | Performed by: PHYSICAL THERAPIST

## 2017-04-04 NOTE — PROGRESS NOTES
PT DAILY TREATMENT NOTE     Patient Name: Sandip House  Date:2017  : 1965  [x]  Patient  Verified  Payor:  / Plan: [facility]  RETIREES AND DEPENDENTS / Product Type:  /    In time: 155 pm  Out time: 255 pm  Total Treatment Time (min): 60  Total Timed Codes (min): 50  1:1 Treatment Time (min):    Visit #: 8 of     Treatment Area: Bilateral shoulder pain [M25.511, M25.512]    SUBJECTIVE  Pain Level (0-10 scale): 4   Any medication changes, allergies to medications, adverse drug reactions, diagnosis change, or new procedure performed?: [x] No    [] Yes (see summary sheet for update)  Subjective functional status/changes:   [] No changes reported   \" My eyes are really bothering me, it feels like there is something in them  \"    OBJECTIVE  Modality rationale: decrease inflammation, decrease pain and increase tissue extensibility to improve the patients ability to perform functional mobility and improve activity  endurance     Min Type Additional Details    [] Estim: []Att   []Unatt        []TENS instruct                  []IFC  []Premod   []NMES                     []Other:  []w/US   []w/ice   []w/heat  Position:  Location:    []  Traction: [] Cervical       []Lumbar                       [] Prone          []Supine                       []Intermittent   []Continuous Lbs:  [] before manual  [] after manual    []  Ultrasound: []Continuous   [] Pulsed                           []1MHz   []3MHz Location:  W/cm2:    []  Iontophoresis with dexamethasone         Location: [] Take home patch   [] In clinic    [x]  Ice     []  heat  []  Ice massage Position:  Location:   10 [x]  Vasopneumatic Device Pressure:       [] lo [x] med [] hi   Temperature: [x] lo [] med [] hi   [] Skin assessment post-treatment:  []intact []redness- no adverse reaction       []redness - adverse reaction:   15 min Manual Therapy:  GH joint mobes (inferior and post gr III/IV), PROM, TrPR to subscap, MFR to L pect. Rationale: decrease pain, increase ROM, increase tissue extensibility and decrease trigger points to perform functional mobility and improve activity  endurance      35 min Therapeutic Exercise:  [] See flow sheet :     Rationale: increase ROM, increase strength and increase proprioception to improve the patients ability to lift and perform OH reaching             min Patient Education: [x] Review HEP    [] Progressed/Changed HEP based on:   [] positioning   [] body mechanics   [] transfers   [] heat/ice application        Other Objective/Functional Measures:    Pain with end range ABD,   C/s Rot to L increases, tightness noted in L scalene mm, performed chin tuck with MF assist to improve fwd head posture, LTG #4 addressed     Pain Level (0-10 scale) post treatment:  2  L shoulder pain   ASSESSMENT/Changes in Function: . Patient reports increase in L shoulder pain with c/s L rot and with L SB, Tightness in SO mm and L scalenes, patient instructed in chin tucks for HEP    Patient will continue to benefit from skilled PT services to modify and progress therapeutic interventions, address functional mobility deficits, address ROM deficits, address strength deficits, analyze and address soft tissue restrictions, analyze and cue movement patterns, analyze and modify body mechanics/ergonomics and assess and modify postural abnormalities to attain remaining goals. []  See Plan of Care  []  See progress note/recertification  []  See Discharge Summary         Progress towards goals / Updated goals: · Short Term Goals: To be accomplished in 1 weeks:  1. Pt will be independent and compliant with HEP  · Long Term Goals: To be accomplished in 8-12 treatments:  1. Patient will increase FOTO score to 65/100 for indications of increased functional mobility. 2. Patient will demo AROM L shoulder flexion to 100 deg for ease with doing her hair / grooming  MET 3-30-17  3.  Patient will demo increased L strength to 3/5 shoulder flexion for progression of lifting to OH shelves 4-/5 flexion MET 3-30-17  4.  Patient will demo negative crossover impingement sign to indicate decreased impingement of RTC with UE activities such as driving  + cross over 4-4-17    PLAN  [x]  Upgrade activities as tolerated     [x]  Continue plan of care  []  Update interventions per flow sheet       []  Discharge due to:_  []  Other:_      Narendra Cast, PT 4/4/2017  10:09 AM

## 2017-04-06 NOTE — PROGRESS NOTES
[address] [facility] [address] [facility] AT [address] [place], [place]  Phone: [phone] Fax: [phone]  PROGRESS NOTE  Patient Name: Erica Cadena : 1965   Treatment/Medical Diagnosis: Bilateral shoulder pain [M25.511, M25.512]   [place]     Date of Initial Visit: 3/8/17 Attended Visits: 9 Missed Visits: 0     SUMMARY OF TREATMENT  Pt is a 45y.o. year old female who presents with co B shoulder pain and popping, L greater than R starting Oct/2016 insidious onset. Ther ex including strengthening, ROM, flexibility, stabilization; manual therapy including: joint mobilizations, DTM, PROM; MFR;  Patient education; HEP, vaso for reduction in pain and edema; postural education  CURRENT STATUS  Pt is making fair progress in therapy. Score on the FOTO has improved from 46/100 at IE to 49/100. Pt responding well to addition of Rx aimed at scalene tightness which is decreasing sxs of nerve involvement in the (L) UE. Pain (W): 8, (B) 3, (A) 4-5  Functional improvements: decreased strain with doing hair; decreased pain with AROM elevation and with lifting and lowering objects  Deficits H adduction (deoderant, scratching); limited in lowering objects       GHJ AROM Flexion R 150, L 134, ER at 0: R 90, L 50, extension R 72, L 58, abd R 142, L 85  L GHJ PROM: full elevation, ER at 90/90: 90, IR at 90/90: 45  R GHJ PROM: approx 160 elevation, ER at 90/90: 90, IR at 90/90: 45  C/S AROM  flexion 17, ext 35, LSB 25, RSB 26, L rotation 40, R rotation 55  GHJ MMT R UE 4/5, L 4-/5  FHRS posture, 17 degrees fwd head  Crossover remains + (L)        Goal for this period include:  · Short Term Goals: To be accomplished in 1 weeks:  1. Pt will be independent and compliant with HEP  Con't to progress as needed (17)  · Long Term Goals: To be accomplished in 8-12 treatments:  1.  Patient will increase FOTO score to 65/100 for indications of increased functional mobility. Slowly progressing at 49/100 (4/7/17)  2. Patient will demo AROM L shoulder flexion to 100 deg for ease with doing her hair / grooming MET 3-30-17. AROM L  (4/7/17)  3. Patient will demo increased L strength to 3/5 shoulder flexion for progression of lifting to OH shelves 4-/5 flexion MET 3-30-17  4. Patient will demo negative crossover impingement sign to indicate decreased impingement of RTC with UE activities such as driving + cross over 4-7-17      New Goals to be achieved in __8-12__  treatments: (ins expires 4/20/17)  1. Patient will increase FOTO score to 65/100 for indications of increased functional mobility. 2. Pt will have an increase in L GHJ flexion to > Or = 150 to improve grooming and self-care   3. Patient will demo increased L strength to > or = 4/5 shoulder flexion for progression of lifting to OH shelves   4. Patient will demo negative crossover impingement sign to indicate decreased impingement of RTC with UE activities such as driving + cross over 4-7-17    G-Codes: na  RECOMMENDATIONS  Recommend pt con't with PT 2x/week through insurance expiration 4/20/17. Thank you   If you have any questions/comments please contact us directly at [phone]. Thank you for allowing us to assist in the care of your patient. Therapist Signature: Coco Montoya DPT Date: 4/6/2017     Time: 6:08 PM   NOTE TO PHYSICIAN:  PLEASE COMPLETE THE ORDERS BELOW AND FAX TO   [facility]: [phone].   If you are unable to process this request in 24 hours please contact our office: [phone].  ___ I have read the above report and request that my patient continue as recommended.   ___ I have read the above report and request that my patient continue therapy with the following changes/special instructions:_________________________________________________________   ___ I have read the above report and request that my patient be discharged from therapy.      Physician Signature:        Date:       Time:

## 2017-04-06 NOTE — PROGRESS NOTES
PT DAILY TREATMENT NOTE     Patient Name: Amarilys Ventura  Date:2017  : 1965  [x]  Patient  Verified  Payor:  / Plan: [facility]  RETIREES AND DEPENDENTS / Product Type: Caleb Carlson /    In time:11:00  Out time:12:05  Total Treatment Time (min): 65  Total Timed Codes (min): 55  1:1 Treatment Time (min): 55   Visit #: 9 of 8-10    Treatment Area: Bilateral shoulder pain [M25.511, M25.512]    SUBJECTIVE  Pain Level (0-10 scale): 3-4/10  Any medication changes, allergies to medications, adverse drug reactions, diagnosis change, or new procedure performed?: [x] No    [] Yes (see summary sheet for update)  Subjective functional status/changes:   [] No changes reported  Pain (W): 8, (B) 3, (A) 4-5  Functional improvements: decreased strain with doing hair; decreased pain with AROM elevation and with lifting and lowering objects  Deficits H adduction (deoderant, scratching); limited in lowering objects       OBJECTIVE  Modality rationale: decrease edema, decrease inflammation and decrease pain to improve the patients ability to improve reaching, dressing, self-care    Min Type Additional Details    [] Estim: []Att   []Unatt        []TENS instruct                  []IFC  []Premod   []NMES                     []Other:  []w/US   []w/ice   []w/heat  Position:  Location:    []  Traction: [] Cervical       []Lumbar                       [] Prone          []Supine                       []Intermittent   []Continuous Lbs:  [] before manual  [] after manual    []  Ultrasound: []Continuous   [] Pulsed                           []1MHz   []3MHz Location:  W/cm2:    []  Iontophoresis with dexamethasone         Location: [] Take home patch   [] In clinic    []  Ice     []  heat  []  Ice massage Position:  Location:   10 [x]  Vasopneumatic Device Pressure:       [] lo [x] med [] hi   Temperature: [x] lo [] med [] hi   [x] Skin assessment post-treatment:  [x]intact []redness- no adverse reaction       []redness - adverse reaction:       45 min Therapeutic Exercise:  [x] See flow sheet :reassessment for PN    Rationale: increase ROM, increase strength and improve coordination to improve the patients ability to improve reaching, dressing, self-care, home care     10 min Manual Therapy:  Posterior and inferior GHJ mobs (B) grade IV. PROm all planes. DTM to (L) pec and deltoid. Rationale: decrease pain, increase ROM, increase tissue extensibility and decrease trigger points to improve reaching, dressing             x min Patient Education: [x] Review HEP    [] Progressed/Changed HEP based on:   [] positioning   [] body mechanics   [] transfers   [] heat/ice application        Other Objective/Functional Measures:   FOTO 49/100 (was 46/100 at IE)  GHJ AROM Flexion R 150, L 134, ER at 0: R 90, L 50, extension R 72, L 58, abd R 142, L 85  L GHJ PROM: full elevation, ER at 90/90: 90, IR at 90/90: 45  R GHJ PROM: approx 160 elevation, ER at 90/90: 90, IR at 90/90: 45  C/S AROM  flexion 17, ext 35, LSB 25, RSB 26, L rotation 40, R rotation 55  GHJ MMT R UE 4/5, L 4-/5  FHRS posture, 17 degrees fwd head  Crossover remains + (L)       Pain Level (0-10 scale) post treatment: 2    ASSESSMENT/Changes in Function: see PN     Patient will continue to benefit from skilled PT services to modify and progress therapeutic interventions, address functional mobility deficits, address ROM deficits, address strength deficits, analyze and address soft tissue restrictions, analyze and cue movement patterns, analyze and modify body mechanics/ergonomics, assess and modify postural abnormalities and instruct in home and community integration to attain remaining goals. []  See Plan of Care  [x]  See progress note/recertification  []  See Discharge Summary         Progress towards goals / Updated goals: · Short Term Goals: To be accomplished in 1 weeks:  1. Pt will be independent and compliant with HEP   · Long Term Goals:  To be accomplished in 8-12 treatments:  1. Patient will increase FOTO score to 65/100 for indications of increased functional mobility. Slowly progressing at 49/100 (4/7/17)  2. Patient will demo AROM L shoulder flexion to 100 deg for ease with doing her hair / grooming MET 3-30-17. AROM L  (4/7/17)  3. Patient will demo increased L strength to 3/5 shoulder flexion for progression of lifting to OH shelves 4-/5 flexion MET 3-30-17  4.  Patient will demo negative crossover impingement sign to indicate decreased impingement of RTC with UE activities such as driving + cross over 4-7-17    PLAN  [x]  Upgrade activities as tolerated     []  Continue plan of care  []  Update interventions per flow sheet       []  Discharge due to:_  [x]  Other:_  Continue through 4/20/17 insurance expiration     Maria Dolores Malcolm, PT 4/6/2017  6:04 PM

## 2017-04-07 ENCOUNTER — HOSPITAL ENCOUNTER (OUTPATIENT)
Dept: PHYSICAL THERAPY | Age: 52
Discharge: HOME | End: 2017-04-07
Payer: OTHER GOVERNMENT

## 2017-04-07 PROCEDURE — 97140 MANUAL THERAPY 1/> REGIONS: CPT

## 2017-04-07 PROCEDURE — 97110 THERAPEUTIC EXERCISES: CPT

## 2017-04-07 PROCEDURE — 97016 VASOPNEUMATIC DEVICE THERAPY: CPT

## 2017-04-11 ENCOUNTER — HOSPITAL ENCOUNTER (OUTPATIENT)
Dept: PHYSICAL THERAPY | Age: 52
Discharge: HOME | End: 2017-04-11
Payer: OTHER GOVERNMENT

## 2017-04-11 PROCEDURE — 97140 MANUAL THERAPY 1/> REGIONS: CPT

## 2017-04-11 PROCEDURE — 97110 THERAPEUTIC EXERCISES: CPT

## 2017-04-11 PROCEDURE — 97016 VASOPNEUMATIC DEVICE THERAPY: CPT

## 2017-04-14 ENCOUNTER — HOSPITAL ENCOUNTER (OUTPATIENT)
Dept: PHYSICAL THERAPY | Age: 52
Discharge: HOME | End: 2017-04-14
Payer: OTHER GOVERNMENT

## 2017-04-14 PROCEDURE — 97016 VASOPNEUMATIC DEVICE THERAPY: CPT

## 2017-04-14 PROCEDURE — 97110 THERAPEUTIC EXERCISES: CPT

## 2017-04-14 PROCEDURE — 97140 MANUAL THERAPY 1/> REGIONS: CPT

## 2017-04-14 NOTE — PROGRESS NOTES
PT DAILY TREATMENT NOTE     Patient Name: Danielito Roman  Date:2017  : 1965  [x]  Patient  Verified  Payor:  / Plan: [facility]  RETIREES AND DEPENDENTS / Product Type:  /    In time:1104  Out time:1201  Total Treatment Time (min): 57  Visit #: 2 of 8    Treatment Area: Bilateral shoulder pain [M25.511, M25.512]    SUBJECTIVE  Pain Level (0-10 scale): 0 at rest   Any medication changes, allergies to medications, adverse drug reactions, diagnosis change, or new procedure performed?: [] No    [x] Yes (see summary sheet for update)  Subjective functional status/changes:   [] No changes reported  \"I went to my doctor. She said to continue with therapy and she gave me some lidocaine patches and flexeril.  And my PCP put me on Gabapentin \"    OBJECTIVE  Modality rationale: decrease edema, decrease inflammation and decrease pain to improve the patients ability to improve dressing, reaching, self-care    Min Type Additional Details    [] Estim: []Att   []Unatt        []TENS instruct                  []IFC  []Premod   []NMES                     []Other:  []w/US   []w/ice   []w/heat  Position:  Location:    []  Traction: [] Cervical       []Lumbar                       [] Prone          []Supine                       []Intermittent   []Continuous Lbs:  [] before manual  [] after manual    []  Ultrasound: []Continuous   [] Pulsed                           []1MHz   []3MHz Location:  W/cm2:    []  Iontophoresis with dexamethasone         Location: [] Take home patch   [] In clinic    []  Ice     []  heat  []  Ice massage Position:  Location:   10 [x]  Vasopneumatic Device L shoulder  Pressure:       [] lo [x] med [] hi   Temperature: [x] lo [] med [] hi   [] Skin assessment post-treatment:  []intact []redness- no adverse reaction       []redness - adverse reaction:       32 min Therapeutic Exercise:  [x] See flow sheet :    Rationale: increase ROM, increase strength and improve coordination to improve the patients ability to improve reaching, dressing, driving, self-care     15 min Manual Therapy:  Supine manual pect stretch and DTM to B pect, manual UT stretch and clavicle/ first rib depression    Rationale: decrease pain, increase ROM and increase tissue extensibility to improve ease and comfort of mobility           x min Patient Education: [x] Review HEP         Other Objective/Functional Measures:    TC'ing for TB therex today to help maintain posture sec to persistent forward shoulder    CS ext decreased 50%     Pain Level (0-10 scale) post treatment: 0    ASSESSMENT/Changes in Function: Patient cont to require VCing for posture and scap depression/ retraction. Encouraged chin tuck with CS extension throughout the day to open ant chain of neck, chest and shoulder. Cont VASO as patient gets pain relief from treatment     Patient will continue to benefit from skilled PT services to modify and progress therapeutic interventions, address functional mobility deficits, address ROM deficits, address strength deficits, analyze and address soft tissue restrictions, analyze and cue movement patterns, analyze and modify body mechanics/ergonomics, assess and modify postural abnormalities and instruct in home and community integration to attain remaining goals. []  See Plan of Care  [x]  See progress note/recertification  [birth year]  []  See Discharge Summary         Progress towards goals / Updated goals: All goals reviewed and progressing appropriately as of 4/14/2017   1. Patient will increase FOTO score to 65/100 for indications of increased functional mobility. 2. Pt will have an increase in L GHJ flexion to > Or = 150 to improve grooming and self-care   3. Patient will demo increased L strength to > or = 4/5 shoulder flexion for progression of lifting to [facility] by increase in TE (4/11/17)  4.  Patient will demo negative crossover impingement sign to indicate decreased impingement of RTC with UE activities such as driving     PLAN  [x]  Upgrade activities as tolerated     []  Continue plan of care  []  Update interventions per flow sheet       []  Discharge due to:_  [x]  Other:_   Cont to encourage proper posture and CS mobility    Taping for posture?     Fran Quintanilla, PT 4/14/2017  10:22 AM

## 2017-04-18 ENCOUNTER — OFFICE VISIT (OUTPATIENT)
Dept: VASCULAR SURGERY | Age: 52
End: 2017-04-18

## 2017-04-18 ENCOUNTER — HOSPITAL ENCOUNTER (OUTPATIENT)
Dept: PHYSICAL THERAPY | Age: 52
Discharge: HOME | End: 2017-04-18
Payer: OTHER GOVERNMENT

## 2017-04-18 VITALS
SYSTOLIC BLOOD PRESSURE: 102 MMHG | HEART RATE: 69 BPM | HEIGHT: 63 IN | WEIGHT: 110 LBS | DIASTOLIC BLOOD PRESSURE: 58 MMHG | RESPIRATION RATE: 12 BRPM | BODY MASS INDEX: 19.49 KG/M2

## 2017-04-18 DIAGNOSIS — I83.899 VARICOSE VEINS WITH COMPLICATIONS: Primary | ICD-10-CM

## 2017-04-18 PROCEDURE — 97140 MANUAL THERAPY 1/> REGIONS: CPT | Performed by: PHYSICAL THERAPIST

## 2017-04-18 PROCEDURE — 97110 THERAPEUTIC EXERCISES: CPT | Performed by: PHYSICAL THERAPIST

## 2017-04-18 NOTE — PROGRESS NOTES
77-year-old female with diffuse varicose veins branch varicosities. She treated for autoimmune disorder she occasionally has painful symptomatic or phlebitic veins. The injection scar therapy is been very helpful for this. She has had a branch varicosities in a classic location today on the right inner calf I injected this today with the 0.5% foamy Asclera with good result.   A topical steroid was applied as well as a light wrap she will follow-up for injection site as needed

## 2017-04-18 NOTE — PROGRESS NOTES
PT DAILY TREATMENT NOTE 8-14    Patient Name: Jake Carter  Date:2017  : 1965  [x]  Patient  Verified  Payor:  / Plan: [facility]  RETIREES AND DEPENDENTS / Product Type:  /    In time: 301pm  Out time: 410pm  Total Treatment Time (min): 69  Visit #: 3 of 8    Treatment Area: Bilateral shoulder pain [M25.511, M25.512]    SUBJECTIVE  Pain Level (0-10 scale): 0 at rest   Any medication changes, allergies to medications, adverse drug reactions, diagnosis change, or new procedure performed?: [] No    [x] Yes (see summary sheet for update)  Subjective functional status/changes:   [] No changes reported  \" I am doing good on my new medication.   I was able to garden without much pain  \"    OBJECTIVE  Modality rationale: decrease edema, decrease inflammation and decrease pain to improve the patients ability to improve dressing, reaching, self-care    Min Type Additional Details    [] Estim: []Att   []Unatt        []TENS instruct                  []IFC  []Premod   []NMES                     []Other:  []w/US   []w/ice   []w/heat  Position:  Location:    []  Traction: [] Cervical       []Lumbar                       [] Prone          []Supine                       []Intermittent   []Continuous Lbs:  [] before manual  [] after manual    []  Ultrasound: []Continuous   [] Pulsed                           []1MHz   []3MHz Location:  W/cm2:    []  Iontophoresis with dexamethasone         Location: [] Take home patch   [] In clinic   10 []  Ice     [x]  heat  []  Ice massage Position:seated to B UT  Location:    []  Vasopneumatic Device L shoulder  Pressure:       [] lo [x] med [] hi   Temperature: [x] lo [] med [] hi   [] Skin assessment post-treatment:  []intact []redness- no adverse reaction       []redness - adverse reaction:       44 min Therapeutic Exercise:  [x] See flow sheet :    Rationale: increase ROM, increase strength and improve coordination to improve the patients ability to improve reaching, dressing, driving, self-care     15 min Manual Therapy:  Supine manual pect stretch and DTM to B pect, manual UT stretch and clavicle/ first rib depression    Rationale: decrease pain, increase ROM and increase tissue extensibility to improve ease and comfort of mobility           x min Patient Education: [x] Review HEP         Other Objective/Functional Measures:    Patient c/o pulling and ear ache with R c/s rot and while doing UBE hand goes numb and tingling, tightness in L scalenes noted and L scm, performed isometrics for L scalenes 8 x 5\", instructed in breathing exercises for depression of clavicle and ER of thumbs to point posterior. , MT of PROM into bilateral rotation and SB, scalene stretch, MHP to B UT    Pain Level (0-10 scale) post treatment: 0    ASSESSMENT/Changes in Function: Patient Instructed in stretch for first rib for HEP and for scap depression exercise/stretch. Patient will continue to benefit from skilled PT services to modify and progress therapeutic interventions, address functional mobility deficits, address ROM deficits, address strength deficits, analyze and address soft tissue restrictions, analyze and cue movement patterns, analyze and modify body mechanics/ergonomics, assess and modify postural abnormalities and instruct in home and community integration to attain remaining goals. []  See Plan of Care  []  See progress note/recertification  9/8/49  []  See Discharge Summary         Progress towards goals / Updated goals: All goals reviewed and progressing appropriately as of 4/18/2017   1. Patient will increase FOTO score to 65/100 for indications of increased functional mobility. 2. Pt will have an increase in L GHJ flexion to > Or = 150 to improve grooming and self-care   3. Patient will demo increased L strength to > or = 4/5 shoulder flexion for progression of lifting to [facility] by increase in TE (4/11/17)  4.  Patient will demo negative crossover impingement sign to indicate decreased impingement of RTC with UE activities such as driving     PLAN  [x]  Upgrade activities as tolerated     []  Continue plan of care  []  Update interventions per flow sheet       []  Discharge due to:_  []  Other:_       Melissa Reynolds, PT 4/18/2017  10:22 AM

## 2017-04-20 ENCOUNTER — HOSPITAL ENCOUNTER (OUTPATIENT)
Dept: PHYSICAL THERAPY | Age: 52
Discharge: HOME | End: 2017-04-20
Payer: OTHER GOVERNMENT

## 2017-04-20 PROCEDURE — 97110 THERAPEUTIC EXERCISES: CPT | Performed by: PHYSICAL THERAPIST

## 2017-04-20 PROCEDURE — 97140 MANUAL THERAPY 1/> REGIONS: CPT | Performed by: PHYSICAL THERAPIST

## 2017-04-20 NOTE — PROGRESS NOTES
PT DAILY TREATMENT NOTE 8-14    Patient Name: Prashant Morgan  Date:2017  : 1965  [x]  Patient  Verified  Payor:  / Plan: [facility]  RETIREES AND DEPENDENTS / Product Type: Sasha Homans /    In time: 435pm  Out time: 540pm  Total Treatment Time (min): 65  Visit #: 4 of 8    Treatment Area: Bilateral shoulder pain [M25.511, M25.512]    SUBJECTIVE  Pain Level (0-10 scale): 0 at rest   Any medication changes, allergies to medications, adverse drug reactions, diagnosis change, or new procedure performed?: [] No    [x] Yes (see summary sheet for update)  Subjective functional status/changes:   [] No changes reported  \" My L hand has been asleep since Tues.   \"    OBJECTIVE  Modality rationale: decrease edema, decrease inflammation and decrease pain to improve the patients ability to improve dressing, reaching, self-care    Min Type Additional Details    [] Estim: []Att   []Unatt        []TENS instruct                  []IFC  []Premod   []NMES                     []Other:  []w/US   []w/ice   []w/heat  Position:  Location:    []  Traction: [] Cervical       []Lumbar                       [] Prone          []Supine                       []Intermittent   []Continuous Lbs:  [] before manual  [] after manual    []  Ultrasound: []Continuous   [] Pulsed                           []1MHz   []3MHz Location:  W/cm2:    []  Iontophoresis with dexamethasone         Location: [] Take home patch   [] In clinic   10 []  Ice     [x]  heat  []  Ice massage Position:seated to B UT  Location:    []  Vasopneumatic Device L shoulder  Pressure:       [] lo [x] med [] hi   Temperature: [x] lo [] med [] hi   [] Skin assessment post-treatment:  []intact []redness- no adverse reaction       []redness - adverse reaction:       40 min Therapeutic Exercise:  [x] See flow sheet :    Rationale: increase ROM, increase strength and improve coordination to improve the patients ability to improve reaching, dressing, driving, self-care 15 min Manual Therapy:  Supine manual pect stretch and DTM to B pect, DTM to B scalenes     Rationale: decrease pain, increase ROM and increase tissue extensibility to improve ease and comfort of mobility           x min Patient Education: [x] Review HEP         Other Objective/Functional Measures:        Pain Level (0-10 scale) post treatment: 0    ASSESSMENT/Changes in Function:  Patient continues with tightness in B scalenes with radiating pain into the L shoulder blade. Scalenes remain in shortened position due to extreme fwd head rounded shoulder posture. Patient will continue to benefit from skilled PT services to modify and progress therapeutic interventions, address functional mobility deficits, address ROM deficits, address strength deficits, analyze and address soft tissue restrictions, analyze and cue movement patterns, analyze and modify body mechanics/ergonomics, assess and modify postural abnormalities and instruct in home and community integration to attain remaining goals. []  See Plan of Care  []  See progress note/recertification  3/6/36  []  See Discharge Summary         Progress towards goals / Updated goals: All goals reviewed and progressing appropriately as of 4/20/2017   1. Patient will increase FOTO score to 65/100 for indications of increased functional mobility. 2. Pt will have an increase in L GHJ flexion to > Or = 150 to improve grooming and self-care   3. Patient will demo increased L strength to > or = 4/5 shoulder flexion for progression of lifting to [facility] by increase in TE (4/11/17)  4.  Patient will demo negative crossover impingement sign to indicate decreased impingement of RTC with UE activities such as driving     PLAN  [x]  Upgrade activities as tolerated     []  Continue plan of care  []  Update interventions per flow sheet       []  Discharge due to:_  []  Other:_       Zaynab Anders, PT 4/20/2017  10:22 AM

## 2017-05-02 ENCOUNTER — HOSPITAL ENCOUNTER (OUTPATIENT)
Dept: PHYSICAL THERAPY | Age: 52
Discharge: HOME | End: 2017-05-02
Payer: OTHER GOVERNMENT

## 2017-05-02 PROCEDURE — 97110 THERAPEUTIC EXERCISES: CPT | Performed by: PHYSICAL THERAPIST

## 2017-05-02 PROCEDURE — 97140 MANUAL THERAPY 1/> REGIONS: CPT | Performed by: PHYSICAL THERAPIST

## 2017-05-02 NOTE — PROGRESS NOTES
PT DAILY TREATMENT NOTE 8-    Patient Name: Pooja Lopez  Date:2017  : 1965  [x]  Patient  Verified  Payor:  / Plan: [facility]  RETIREES AND DEPENDENTS / Product Type:  /    In time: 234 pm  Out time: 345 pm  Total Treatment Time (min): 71  Visit #: 5 of 8    Treatment Area: Bilateral shoulder pain [M25.511, M25.512]    SUBJECTIVE  Pain Level (0-10 scale): 1 at rest   Any medication changes, allergies to medications, adverse drug reactions, diagnosis change, or new procedure performed?: [] No    [x] Yes (see summary sheet for update)  Subjective functional status/changes:   [] No changes reported  \" I have just been noticing what I can and can't do. I can reaach straight up with my L shoulder without much weight, but I can do my hair now and fix my bra strap. I still can't reach across my body or shave under my R arm with L hand, and I can't scratch the back of my R shoulder with L hand. I still can't reach out like to grab a ticket.   \"    OBJECTIVE  Modality rationale: decrease edema, decrease inflammation and decrease pain to improve the patients ability to improve dressing, reaching, self-care    Min Type Additional Details    [] Estim: []Att   []Unatt        []TENS instruct                  []IFC  []Premod   []NMES                     []Other:  []w/US   []w/ice   []w/heat  Position:  Location:    []  Traction: [] Cervical       []Lumbar                       [] Prone          []Supine                       []Intermittent   []Continuous Lbs:  [] before manual  [] after manual    []  Ultrasound: []Continuous   [] Pulsed                           []1MHz   []3MHz Location:  W/cm2:    []  Iontophoresis with dexamethasone         Location: [] Take home patch   [] In clinic   10 []  Ice     [x]  heat  []  Ice massage Position:seated to B UT  Location:    []  Vasopneumatic Device L shoulder  Pressure:       [] lo [x] med [] hi   Temperature: [x] lo [] med [] hi   [] Skin assessment post-treatment:  []intact []redness- no adverse reaction       []redness - adverse reaction:       46 min Therapeutic Exercise:  [x] See flow sheet : added cans below 90 2 way scaption, flexion. Rationale: increase ROM, increase strength and improve coordination to improve the patients ability to improve reaching, dressing, driving, self-care     15 min Manual Therapy: [facility] Inferior/post IV, PROM all planes, isometrics in IR/ER    Rationale: decrease pain, increase ROM and increase tissue extensibility to improve ease and comfort of mobility           x min Patient Education: [x] Review HEP         Other Objective/Functional Measures: L shoulder flexion strength:  3+/5, AROM flexion: 132 deg. Added can exercises with 1# on R UE and 0# for L. Pain noted with upright/normal  standing in low back. Significant UT compensations noted with Rows and AROM shoulder flexion. Pain Level (0-10 scale) post treatment: 0-1    ASSESSMENT/Changes in Function:  Significant strength deficits in L shoulder flexion, and ER with pain noted, cogwheel feeling with PROM IR/ER on L shoulder, pain with lifting L shoulder above 90 especially with light objects    Patient will continue to benefit from skilled PT services to modify and progress therapeutic interventions, address functional mobility deficits, address ROM deficits, address strength deficits, analyze and address soft tissue restrictions, analyze and cue movement patterns, analyze and modify body mechanics/ergonomics, assess and modify postural abnormalities and instruct in home and community integration to attain remaining goals. []  See Plan of Care  []  See progress note/recertification  6/1/07  []  See Discharge Summary         Progress towards goals / Updated goals: All goals reviewed and progressing appropriately as of 5/2/2017   1. Patient will increase FOTO score to 65/100 for indications of increased functional mobility.   2. Pt will have an increase in L GHJ flexion to > Or = 150 to improve grooming and self-care 132 deg 5-2-17 pain descending  3. Patient will demo increased L strength to > or = 4/5 shoulder flexion for progression of lifting to [place] shelves  3+  5-2-17  4.  Patient will demo negative crossover impingement sign to indicate decreased impingement of RTC with UE activities such as driving     PLAN  []  Upgrade activities as tolerated     []  Continue plan of care  []  Update interventions per flow sheet       []  Discharge due to:_  [x]  Other:_PN next visit       Melissa Reynolds, PT 5/2/2017  10:22 AM

## 2017-05-09 ENCOUNTER — HOSPITAL ENCOUNTER (OUTPATIENT)
Dept: PHYSICAL THERAPY | Age: 52
Discharge: HOME | End: 2017-05-09
Payer: OTHER GOVERNMENT

## 2017-05-09 PROCEDURE — 97140 MANUAL THERAPY 1/> REGIONS: CPT

## 2017-05-09 PROCEDURE — 97110 THERAPEUTIC EXERCISES: CPT

## 2017-05-09 NOTE — PROGRESS NOTES
[address] [facility] [address] [facility] [address] Nicolasa Siegel  Phone: [phone] Fax: [phone]  PROGRESS NOTE  Patient Name: Cate Robert : 1965   Treatment/Medical Diagnosis: Bilateral shoulder pain [M25.511, M25.512]   Referral Source: Pawel Cutler     Date of Initial Visit: 3/8/17 Attended Visits: 14 Missed Visits: 0     SUMMARY OF TREATMENT  Pt is a 45y.o. year old female who presents with co B shoulder pain and popping, L greater than R starting Oct/2016 insidious onset. Ther ex including strengthening, ROM, flexibility, stabilization; manual therapy including: joint mobilizations, DTM, PROM; MFR;  Patient education; HEP, vaso for reduction in pain and edema; postural education  CURRENT STATUS  Pt is making steady progress with PT. She is able to demo increased elevation, however cont to have poor postural deficits causing impingmenet sx and lack of proper RTC and postural strength. Other assessment as follows:  Pain at best 1, at worst 6/10  Subjective % improvement 75%  Objective:    Shoulder AROM (L) : flexion 141, ABD 90, ER : 49 IR/ ADD L 4    Shoulder strength (L) : 3+/5 grossly within available range     Postural strength (L) MT 3, LT 3-   Posture : mild improvement, still FHRS   Improvements: OH reaching, fixing hair and putting on bra, putting on deodorant with adaptation, increased activity tolerance with grandchildren     Deficits reach across body, shave under R arm , reaching out to grab ticket out of window, scratching R shoulder, tucking shirt, strength     Goal for this period include:  . Patient will increase FOTO score to 65/100 for indications of increased functional mobility goal progressing 58/100  2. Pt will have an increase in L GHJ flexion to > Or = 150 to improve grooming and self-care goal progressing - 141   3.  Patient will demo increased L strength to > or = 4/5 shoulder flexion for progression of lifting to [facility] shelves Goal progressing - 3+/4  4. Patient will demo negative crossover impingement sign to indicate decreased impingement of RTC with UE activities such as driving goal not met - improving with horizontal ADD ROM, but continued pain     CONT PER ABOVE GOALS - PROGRESSING BUT NOT MET     G-Codes: na  RECOMMENDATIONS  Recommend pt con't with PT 2x/week for 8-12 sessions to address above deficits and progress to increased function . If you have any questions/comments please contact us directly at [phone]. Thank you for allowing us to assist in the care of your patient. Therapist Signature: Batool Earl DPBELKYS Date: 5/9/2017     Time: 65 PM    NOTE TO PHYSICIAN:  PLEASE COMPLETE THE ORDERS BELOW AND FAX TO   [facility]: [phone]. If you are unable to process this request in 24 hours please contact our office: [phone].  ___ I have read the above report and request that my patient continue as recommended.   ___ I have read the above report and request that my patient continue therapy with the following changes/special instructions:_________________________________________________________   ___ I have read the above report and request that my patient be discharged from therapy.      Physician Signature:        Date:       Time:

## 2017-05-09 NOTE — PROGRESS NOTES
PT DAILY TREATMENT NOTE 8-14    Patient Name: Sandip House  Date:2017  : 1965  [x]  Patient  Verified  Payor:  / Plan: [facility]  RETIREES AND DEPENDENTS / Product Type:  /    In time: 131  Out time: 246  Total Treatment Time (min): 75  Visit #: 6 of 8    Treatment Area: Bilateral shoulder pain [M25.511, M25.512]    SUBJECTIVE  Pain Level (0-10 scale): 1  Any medication changes, allergies to medications, adverse drug reactions, diagnosis change, or new procedure performed?: [] No    [x] Yes (see summary sheet for update)  Subjective functional status/changes:   [] No changes reported  \"I should have change the light before I came today\"    OBJECTIVE  Modality rationale: decrease edema, decrease inflammation and decrease pain to improve the patients ability to improve dressing, reaching, self-care    Min Type Additional Details    [] Estim: []Att   []Unatt        []TENS instruct                  []IFC  []Premod   []NMES                     []Other:  []w/US   []w/ice   []w/heat  Position:  Location:    []  Traction: [] Cervical       []Lumbar                       [] Prone          []Supine                       []Intermittent   []Continuous Lbs:  [] before manual  [] after manual    []  Ultrasound: []Continuous   [] Pulsed                           []1MHz   []3MHz Location:  W/cm2:    []  Iontophoresis with dexamethasone         Location: [] Take home patch   [] In clinic   10 []  Ice     [x]  heat  []  Ice massage Position:seated to B UT  Location:    []  Vasopneumatic Device L shoulder  Pressure:       [] lo [x] med [] hi   Temperature: [x] lo [] med [] hi   [x] Skin assessment post-treatment:  [x]intact []redness- no adverse reaction       []redness - adverse reaction:       55 min Therapeutic Exercise:  [x] See flow sheet : reassess (3 units billed ) - added SL ER and ABD    Rationale: increase ROM, increase strength and improve coordination to improve the patients ability to improve reaching, dressing, driving, self-care     10 min Manual Therapy: PROM flexion, ABD and ER, RS at 90 deg flexion    Rationale: decrease pain, increase ROM and increase tissue extensibility to improve ease and comfort of mobility           x min Patient Education: [x] Review HEP         Other Objective/Functional Measures:    Goals assessed for continuation   Pain at best 1, at worst 6/10  Subjective % improvement 75%  Objective:    Shoulder AROM (L) : flexion 141, ABD 90, ER : 49 IR/ ADD L 4    Shoulder strength (L) : 3+/5 grossly within available range     Postural strength (L) MT 3, LT 3-   Posture : mild improvement, still FHRS   Improvements: OH reaching, fixing hair and putting on bra, putting on deodorant with adaptation, increased activity tolerance with grandchildren     Deficits reach across body, shave under R arm , reaching out to grab ticket out of window, scratching R shoulder, tucking shirt, strength     Pain Level (0-10 scale) post treatment: 0-1    ASSESSMENT/Changes in Function:  SEE PN     Patient will continue to benefit from skilled PT services to modify and progress therapeutic interventions, address functional mobility deficits, address ROM deficits, address strength deficits, analyze and address soft tissue restrictions, analyze and cue movement patterns, analyze and modify body mechanics/ergonomics, assess and modify postural abnormalities and instruct in home and community integration to attain remaining goals. []  See Plan of Care  [x]  See progress note/recertification  0/6/92  []  See Discharge Summary         Progress towards goals / Updated goals:  1. Patient will increase FOTO score to 65/100 for indications of increased functional mobility goal progressing 58/100  2. Pt will have an increase in L GHJ flexion to > Or = 150 to improve grooming and self-care goal progressing - 141   3.  Patient will demo increased L strength to > or = 4/5 shoulder flexion for progression of lifting to [place]  Goal progressing - 3+/4  4.  Patient will demo negative crossover impingement sign to indicate decreased impingement of RTC with UE activities such as driving goal not met - improving with horizontal ADD ROM, but continued pain     PLAN  [x]  Upgrade activities as tolerated     []  Continue plan of care  []  Update interventions per flow sheet       []  Discharge due to:_  [x]  Other:_See PN for continuation    Start strengthening      Karol Mary, PT 5/9/2017

## 2017-05-16 ENCOUNTER — HOSPITAL ENCOUNTER (OUTPATIENT)
Dept: PHYSICAL THERAPY | Age: 52
Discharge: HOME | End: 2017-05-16
Payer: OTHER GOVERNMENT

## 2017-05-16 PROCEDURE — 97110 THERAPEUTIC EXERCISES: CPT | Performed by: PHYSICAL THERAPIST

## 2017-05-16 PROCEDURE — 97140 MANUAL THERAPY 1/> REGIONS: CPT | Performed by: PHYSICAL THERAPIST

## 2017-05-16 NOTE — PROGRESS NOTES
PT DAILY TREATMENT NOTE     Patient Name: Julio Astorga  Date:2017  : 1965  [x]  Patient  Verified  Payor:  / Plan: [facility]  RETIREES AND DEPENDENTS / Product Type:  /    In time: 210pm   Out time: 330  Total Treatment Time (min): 80  Visit #: 1 of     Treatment Area: Bilateral shoulder pain [M25.511, M25.512]    SUBJECTIVE  Pain Level (0-10 scale): 1  Any medication changes, allergies to medications, adverse drug reactions, diagnosis change, or new procedure performed?: [] No    [x] Yes (see summary sheet for update)  Subjective functional status/changes:   [] No changes reported  \" I see the neurologist this week.  \"    OBJECTIVE  Modality rationale: decrease edema, decrease inflammation and decrease pain to improve the patients ability to improve dressing, reaching, self-care    Min Type Additional Details    [] Estim: []Att   []Unatt        []TENS instruct                  []IFC  []Premod   []NMES                     []Other:  []w/US   []w/ice   []w/heat  Position:  Location:    []  Traction: [] Cervical       []Lumbar                       [] Prone          []Supine                       []Intermittent   []Continuous Lbs:  [] before manual  [] after manual    []  Ultrasound: []Continuous   [] Pulsed                           []1MHz   []3MHz Location:  W/cm2:    []  Iontophoresis with dexamethasone         Location: [] Take home patch   [] In clinic   10 []  Ice     [x]  heat  []  Ice massage Position:seated to B UT  Location:    []  Vasopneumatic Device L shoulder  Pressure:       [] lo [x] med [] hi   Temperature: [x] lo [] med [] hi   [x] Skin assessment post-treatment:  [x]intact []redness- no adverse reaction       []redness - adverse reaction:       55 min Therapeutic Exercise:  [x] See flow sheet :  added SL ER and ABD with 1#    Rationale: increase ROM, increase strength and improve coordination to improve the patients ability to improve reaching, dressing, driving, self-care     15 min Manual Therapy: PROM flexion, ABD and ER, RS at 90 deg flexion    Rationale: decrease pain, increase ROM and increase tissue extensibility to improve ease and comfort of mobility           x min Patient Education: [x] Review HEP         Other Objective/Functional Measures:    Upper Trap substitution noted with RC strengthening  band exercises with TC needed, increased  SL/ ER with 1# weight     Pain Level (0-10 scale) post treatment: 0    ASSESSMENT/Changes in Function:  Pain noted on L shoulder with eccentric lowering with can exercises, + cross arm test on L, TTP over first rib with elevation noted due to tight scalenes on L: pain noted at end range flexion over first rib with tingling in L hand noted. Patient will continue to benefit from skilled PT services to modify and progress therapeutic interventions, address functional mobility deficits, address ROM deficits, address strength deficits, analyze and address soft tissue restrictions, analyze and cue movement patterns, analyze and modify body mechanics/ergonomics, assess and modify postural abnormalities and instruct in home and community integration to attain remaining goals. []  See Plan of Care  []  See progress note/recertification  2/1/40  []  See Discharge Summary         Progress towards goals / Updated goals:  . Patient will increase FOTO score to 65/100 for indications of increased functional mobility goal progressing 58/100  2. Pt will have an increase in L GHJ flexion to > Or = 150 to improve grooming and self-care goal progressing - 141   3. Patient will demo increased L strength to > or = 4/5 shoulder flexion for progression of lifting to [employer] shelves Goal progressing - 3+/4  4.  Patient will demo negative crossover impingement sign to indicate decreased impingement of RTC with UE activities such as driving goal not met - improving with horizontal ADD ROM, but continued pain   PLAN  [x]  Upgrade activities as tolerated     []  Continue plan of care  []  Update interventions per flow sheet       []  Discharge due to:_  [x]  Other:_See PN for continuation    Start strengthening      Sagar Bernstein, PT 5/16/2017

## 2017-05-19 ENCOUNTER — HOSPITAL ENCOUNTER (OUTPATIENT)
Dept: PHYSICAL THERAPY | Age: 52
End: 2017-05-19
Payer: OTHER GOVERNMENT

## 2017-05-23 ENCOUNTER — HOSPITAL ENCOUNTER (OUTPATIENT)
Dept: PHYSICAL THERAPY | Age: 52
Discharge: HOME | End: 2017-05-23
Payer: OTHER GOVERNMENT

## 2017-05-23 PROCEDURE — 97110 THERAPEUTIC EXERCISES: CPT | Performed by: PHYSICAL THERAPIST

## 2017-05-23 NOTE — PROGRESS NOTES
PT DAILY TREATMENT NOTE     Patient Name: Connie Sahni  Date:2017  : 1965  [x]  Patient  Verified  Payor:  / Plan: [facility]  RETIREES AND DEPENDENTS / Product Type:  /    In time: 1106am   Out time: 1158a  Total Treatment Time (min): 52  Visit #: 2 of     Treatment Area: Bilateral shoulder pain [M25.511, M25.512]    SUBJECTIVE  Pain Level (0-10 scale): 1-2  Any medication changes, allergies to medications, adverse drug reactions, diagnosis change, or new procedure performed?: [] No    [x] Yes (see summary sheet for update)  Subjective functional status/changes:   [] No changes reported  \" The neurologist increased my Neurontin, and he is sending me for 3 different tests to determine stroke risks, and a test to determine blood flow to my head.   He said I had carpal tunnel  \"    OBJECTIVE  Modality rationale: decrease edema, decrease inflammation and decrease pain to improve the patients ability to improve dressing, reaching, self-care    Min Type Additional Details    [] Estim: []Att   []Unatt        []TENS instruct                  []IFC  []Premod   []NMES                     []Other:  []w/US   []w/ice   []w/heat  Position:  Location:    []  Traction: [] Cervical       []Lumbar                       [] Prone          []Supine                       []Intermittent   []Continuous Lbs:  [] before manual  [] after manual    []  Ultrasound: []Continuous   [] Pulsed                           []1MHz   []3MHz Location:  W/cm2:    []  Iontophoresis with dexamethasone         Location: [] Take home patch   [] In clinic   10 []  Ice     [x]  heat  []  Ice massage Position:seated to B UT  Location:    []  Vasopneumatic Device L shoulder  Pressure:       [] lo [x] med [] hi   Temperature: [x] lo [] med [] hi   [x] Skin assessment post-treatment:  [x]intact []redness- no adverse reaction       []redness - adverse reaction:       42 min Therapeutic Exercise:  [x] See flow sheet : Rationale: increase ROM, increase strength and improve coordination to improve the patients ability to improve reaching, dressing, driving, self-care     TC min Manual Therapy: PROM flexion, ABD and ER, RS at 90 deg flexion    Rationale: decrease pain, increase ROM and increase tissue extensibility to improve ease and comfort of mobility           x min Patient Education: [x] Review HEP         Other Objective/Functional Measures:    Progressed TB to 20 reps , progressed to 1# with prone letters with good tolerance             LTG #2 regressed from 140 to 130, however measurement taken in standing wheras measured in supine   Pain Level (0-10 scale) post treatment: 0    ASSESSMENT/Changes in Function:  Patient progressing slowly secondary to significant arthritis and postural disturbances/MF tissue tightness in lateral c/s mm and ant chest mm. Patient did well with progression of resistance with  therex today. Patient will continue to benefit from skilled PT services to modify and progress therapeutic interventions, address functional mobility deficits, address ROM deficits, address strength deficits, analyze and address soft tissue restrictions, analyze and cue movement patterns, analyze and modify body mechanics/ergonomics, assess and modify postural abnormalities and instruct in home and community integration to attain remaining goals. []  See Plan of Care  []  See progress note/recertification  6/5/32  []  See Discharge Summary         Progress towards goals / Updated goals:  . Patient will increase FOTO score to 65/100 for indications of increased functional mobility goal progressing 58/100  2. Pt will have an increase in L GHJ flexion to > Or = 150 to improve grooming and self-care goal regressed - 110/130 AROM/PROM  5-23-17 from 140  3. Patient will demo increased L strength to > or = 4/5 shoulder flexion for progression of lifting to [email or web address] Goal progressing - 3+/4  4.  Patient will demo negative crossover impingement sign to indicate decreased impingement of RTC with UE activities such as driving goal not met - improving with horizontal ADD ROM, but continued pain   PLAN  [x]  Upgrade activities as tolerated     []  Continue plan of care  []  Update interventions per flow sheet       []  Discharge due to:_  []  Other:    Zeyad Pain, PT 5/23/2017

## 2017-05-26 ENCOUNTER — HOSPITAL ENCOUNTER (OUTPATIENT)
Dept: PHYSICAL THERAPY | Age: 52
Discharge: HOME | End: 2017-05-26
Payer: OTHER GOVERNMENT

## 2017-05-26 PROCEDURE — 97110 THERAPEUTIC EXERCISES: CPT

## 2017-05-26 NOTE — PROGRESS NOTES
PT DAILY TREATMENT NOTE     Patient Name: Viji Burn  Date:2017  : 1965  [x]  Patient  Verified  Payor:  / Plan: [facility]  RETIREES AND DEPENDENTS / Product Type:  /    In time: 804 Out time: 917  Total Treatment Time (min): 77  Visit #: 3 of     Treatment Area: Bilateral shoulder pain [M25.511, M25.512]    SUBJECTIVE  Pain Level (0-10 scale): 1-2  Any medication changes, allergies to medications, adverse drug reactions, diagnosis change, or new procedure performed?: [] No    [x] Yes (see summary sheet for update)  Subjective functional status/changes:   [x] No changes reported  'Doing ok\"    OBJECTIVE  Modality rationale: decrease edema, decrease inflammation and decrease pain to improve the patients ability to improve dressing, reaching, self-care    Min Type Additional Details    [] Estim: []Att   []Unatt        []TENS instruct                  []IFC  []Premod   []NMES                     []Other:  []w/US   []w/ice   []w/heat  Position:  Location:    []  Traction: [] Cervical       []Lumbar                       [] Prone          []Supine                       []Intermittent   []Continuous Lbs:  [] before manual  [] after manual    []  Ultrasound: []Continuous   [] Pulsed                           []1MHz   []3MHz Location:  W/cm2:    []  Iontophoresis with dexamethasone         Location: [] Take home patch   [] In clinic   10 []  Ice     [x]  heat  []  Ice massage Position:seated to B UT  Location:    []  Vasopneumatic Device L shoulder  Pressure:       [] lo [x] med [] hi   Temperature: [x] lo [] med [] hi   [x] Skin assessment post-treatment:  [x]intact []redness- no adverse reaction       []redness - adverse reaction:       56 min Therapeutic Exercise:  [x] See flow sheet :     Rationale: increase ROM, increase strength and improve coordination to improve the patients ability to improve reaching, dressing, driving, self-care     TC sec to 11 min late min Manual Therapy: PROM flexion, ABD and ER, RS at 90 deg flexion    Rationale: decrease pain, increase ROM and increase tissue extensibility to improve ease and comfort of mobility           x min Patient Education: [x] Review HEP         Other Objective/Functional Measures:    LTG 2 - AROM flexion L 154, R 157 (AAROM with patient assistance increased)    Pain Level (0-10 scale) post treatment: 0    ASSESSMENT/Changes in Function:  Patient required continued postural cueing to prevent forward shoulder. Added waiters pose at wall to improve postural awareness. Patient reports improved muscle tone of tricep. Improved AROM exceeding goal.     Patient will continue to benefit from skilled PT services to modify and progress therapeutic interventions, address functional mobility deficits, address ROM deficits, address strength deficits, analyze and address soft tissue restrictions, analyze and cue movement patterns, analyze and modify body mechanics/ergonomics, assess and modify postural abnormalities and instruct in home and community integration to attain remaining goals. []  See Plan of Care  []  See progress note/recertification  2/9/07  []  See Discharge Summary         Progress towards goals / Updated goals:  . Patient will increase FOTO score to 65/100 for indications of increased functional mobility goal progressing 58/100  2. Pt will have an increase in L GHJ flexion to > Or = 150 to improve grooming and self-care goal met today at 154 5/26/17  3. Patient will demo increased L strength to > or = 4/5 shoulder flexion for progression of lifting to [place] Goal progressing - 3+/4  4.  Patient will demo negative crossover impingement sign to indicate decreased impingement of RTC with UE activities such as driving goal not met - improving with horizontal ADD ROM, but continued pain     PLAN  [x]  Upgrade activities as tolerated     []  Continue plan of care  []  Update interventions per flow sheet       []  Discharge due to:_  [x]  Other: cont to work posture   Resume manual ALLEN Brock, PT 5/26/2017

## 2017-05-30 ENCOUNTER — HOSPITAL ENCOUNTER (OUTPATIENT)
Dept: PHYSICAL THERAPY | Age: 52
End: 2017-05-30
Payer: OTHER GOVERNMENT

## 2017-06-02 ENCOUNTER — HOSPITAL ENCOUNTER (OUTPATIENT)
Dept: PHYSICAL THERAPY | Age: 52
Discharge: HOME | End: 2017-06-02
Payer: OTHER GOVERNMENT

## 2017-06-02 PROCEDURE — 97110 THERAPEUTIC EXERCISES: CPT | Performed by: PHYSICAL THERAPIST

## 2017-06-02 NOTE — PROGRESS NOTES
PT DAILY TREATMENT NOTE     Patient Name: Pooja Lopez  Date:2017  : 1965  [x]  Patient  Verified  Payor:  / Plan: [facility]  RETIREES AND DEPENDENTS / Product Type:  /    In time: 3531 Out time: 1138am  Total Treatment Time (min): 62  Visit #: 5 of     Treatment Area: Bilateral shoulder pain [M25.511, M25.512]    SUBJECTIVE  Pain Level (0-10 scale): 1  Any medication changes, allergies to medications, adverse drug reactions, diagnosis change, or new procedure performed?: [] No    [x] Yes (see summary sheet for update)  Subjective functional status/changes:   [x] No changes reported  \" This is my last appt \"    OBJECTIVE  Modality rationale: decrease edema, decrease inflammation and decrease pain to improve the patients ability to improve dressing, reaching, self-care    Min Type Additional Details    [] Estim: []Att   []Unatt        []TENS instruct                  []IFC  []Premod   []NMES                     []Other:  []w/US   []w/ice   []w/heat  Position:  Location:    []  Traction: [] Cervical       []Lumbar                       [] Prone          []Supine                       []Intermittent   []Continuous Lbs:  [] before manual  [] after manual    []  Ultrasound: []Continuous   [] Pulsed                           []1MHz   []3MHz Location:  W/cm2:    []  Iontophoresis with dexamethasone         Location: [] Take home patch   [] In clinic   10 []  Ice     [x]  heat  []  Ice massage Position:seated to B UT  Location:    []  Vasopneumatic Device L shoulder  Pressure:       [] lo [x] med [] hi   Temperature: [x] lo [] med [] hi   [x] Skin assessment post-treatment:  [x]intact []redness- no adverse reaction       []redness - adverse reaction:       47 min Therapeutic Exercise:  [x] See flow sheet :     Rationale: increase ROM, increase strength and improve coordination to improve the patients ability to improve reaching, dressing, driving, self-care     5 min Manual Therapy: PROM all planes L shoulder   Rationale: decrease pain, increase ROM and increase tissue extensibility to improve ease and comfort of mobility           x min Patient Education: [x] Review HEP         Other Objective/Functional Measures:  Patient states she had an injection in L shoulder on Wed. With mild improvements. Patient reports modification with grooming, shaving ,deopderant, less pain with putting on bra. Patient continues to report pain with reaching out to side , like to pull a ticket for parking and pain with pushing open doors. She reports back pain is increasing, but patient remains very active and performs heavy home improvement activities. Patient continues to have postural dysfunction with significant FHRS posture/ l/s flexion  noted with all activities,   L shoulder PROM in Flexion, ABD, ER is WNL, IR: 55 deg. Pain noted with resisted Flexion, ABD. Pain Level (0-10 scale) post treatment: 0    ASSESSMENT/Changes in Function:  See DC note    Patient will continue to benefit from skilled PT services to modify and progress therapeutic interventions, address functional mobility deficits, address ROM deficits, address strength deficits, analyze and address soft tissue restrictions, analyze and cue movement patterns, analyze and modify body mechanics/ergonomics, assess and modify postural abnormalities and instruct in home and community integration to attain remaining goals. []  See Plan of Care  []  See progress note/recertification    [x]  See Discharge Summary         Progress towards goals / Updated goals:  . Patient will increase FOTO score to 65/100 for indications of increased functional mobility 63  2. Pt will have an increase in L GHJ flexion to > Or = 150 to improve grooming and self-care  AROM :130, PROM: 150, AAROM: 145 deg63  3. Patient will demo increased L strength to > or = 4/5 shoulder flexion for progression of lifting to [place] [employer] Goal progressing - 4-/4  4.  Patient will demo negative crossover impingement sign to indicate decreased impingement of RTC with UE activities such as driving goal not met - improving with horizontal ADD ROM, but continued pain.      PLAN  []  Upgrade activities as tolerated     []  Continue plan of care  []  Update interventions per flow sheet       [x]  Discharge due to: plateau of progression toward goals  []  Other: cont to work posture       Judith Rdz, PT 6/2/2017

## 2017-06-02 NOTE — PROGRESS NOTES
[address] [facility] [address] [facility] AT [address], [place], [place]  Phone: [phone] Fax [phone]  DISCHARGE SUMMARY  Patient Name: Zhang Martines : 1965   Treatment/Medical Diagnosis: Bilateral shoulder pain [M25.511, M25.512]   Referral Source: SONA Jade     Date of Initial Visit: 3-8-17 Attended Visits: 19 Missed Visits: 0     SUMMARY OF TREATMENT  Pt is a 45y.o. year old female who presents with co B shoulder pain and popping, L greater than R starting Oct/2016 insidious onset. Ther ex including strengthening, ROM, flexibility, stabilization; manual therapy including: joint mobilizations, DTM, PROM; MFR; Patient education; HEP, vaso for reduction in pain and edema; postural education    CURRENT STATUS  Patient reports 75% improvement in sx since [facility]. Patient states she had an injection in L shoulder on Wed. With mild improvements. Patient reports modification with grooming, shaving ,deopderant, but with less pain with putting on bra/deoderant. Patient continues to report pain with reaching out to side , like to pull a ticket for parking and pain with pushing open doors. She reports back pain is increasing, but patient remains very active and performs heavy home improvement and lifting activities. Patient continues to have postural dysfunction with significant FHRS posture/ l/s flexion noted with all activities. L shoulder PROM in Flexion, ABD, ER is WNL, IR: 55 deg. Pain noted with resisted Flexion  L shoulder AROM flexion: AROM :130, PROM: 150, AAROM: 145 deg    Progress towards goals / Updated goals:  . Patient will increase FOTO score to 65/100 for indications of increased functional mobility 63  2. Pt will have an increase in L GHJ flexion to > Or = 150 to improve grooming and self-care  AROM :130, PROM: 150, AAROM: 145 deg  3.  Patient will demo increased L strength to > or = 4/5 shoulder flexion for progression of lifting to [place] [employer] Goal progressing - 4-/4  4. Patient will demo negative crossover impingement sign to indicate decreased impingement of RTC with UE activities such as driving goal not met - improving with horizontal ADD ROM, but continued pain. RECOMMENDATIONS  Discontinue therapy. Progressing towards or have reached established goals. Gcode: If you have any questions/comments please contact us directly at [phone]. Thank you for allowing us to assist in the care of your patient.   Therapist Signature: Zaynab Anders PT Date: 6-2-17   Reporting Period:  Time: 11:14 AM

## 2017-08-10 ENCOUNTER — OFFICE VISIT (OUTPATIENT)
Dept: VASCULAR SURGERY | Age: 52
End: 2017-08-10

## 2017-08-10 VITALS
WEIGHT: 110 LBS | SYSTOLIC BLOOD PRESSURE: 102 MMHG | HEART RATE: 64 BPM | RESPIRATION RATE: 16 BRPM | HEIGHT: 63 IN | DIASTOLIC BLOOD PRESSURE: 58 MMHG | BODY MASS INDEX: 19.49 KG/M2

## 2017-08-10 DIAGNOSIS — I83.893 VARICOSE VEINS OF LOWER EXTREMITIES WITH OTHER COMPLICATIONS: Primary | ICD-10-CM

## 2017-08-10 RX ORDER — METHOTREXATE 20 MG/.4ML
INJECTION, SOLUTION SUBCUTANEOUS
COMMUNITY
Start: 2017-06-09 | End: 2017-11-12

## 2017-08-10 RX ORDER — TOPIRAMATE 25 MG/1
TABLET ORAL
COMMUNITY
Start: 2017-08-08 | End: 2017-11-01 | Stop reason: DRUGHIGH

## 2017-08-11 NOTE — PROGRESS NOTES
Ms Lakeshia Galvin comes in today with concerns about areas where she had sclerotherapy between both legs earlier this year  She actually has \"staining\" or hyperpigmentation where the veins were injected  She states she cannot recall how long the discoloration may have been there because most is in the back of her legs and she seemingly just casually noticed it when passing by a mirror    She had injections in 2015 as well, which would have been the same medication, and no reaction like this previously!     I was able to show images to Dr Carlos Avendaño with the patient's permission, since he was not in the office at the time  He suggested this should in fact fade over time and I did call her back with that feedback and reassurance    If any worsening changes let us know  I offered that there are some skin lightening and even scar creams that patients have often tried for relief of skin discoloration or scarring if she wanted to look into something of that nature  She will research that    She is also concerned if some of her new RA medications could have attributed and I stated I was unsure  She plans to discuss with rheumatology at her next visit

## 2017-09-27 ENCOUNTER — HOSPITAL ENCOUNTER (OUTPATIENT)
Dept: PHYSICAL THERAPY | Age: 52
Discharge: HOME | End: 2017-09-27
Payer: OTHER GOVERNMENT

## 2017-09-27 PROCEDURE — 97110 THERAPEUTIC EXERCISES: CPT

## 2017-09-27 PROCEDURE — 97140 MANUAL THERAPY 1/> REGIONS: CPT

## 2017-09-27 PROCEDURE — 97530 THERAPEUTIC ACTIVITIES: CPT

## 2017-09-27 PROCEDURE — 97162 PT EVAL MOD COMPLEX 30 MIN: CPT

## 2017-09-27 NOTE — PROGRESS NOTES
PHYSICAL THERAPY - DAILY TREATMENT NOTE    Patient Name: Lux Bee        Date: 2017  : 1965   YES Patient  Verified  Visit #:     Insurance: Payor:  / Plan: [address] DEPENDENTS / Product Type:  /      In time: 10:10 Out time: 11:00   Total Treatment Time: 50     Medicare Time Tracking (below)   Total Timed Codes (min):  NA 1:1 Treatment Time:  NA     TREATMENT AREA =  B UE's    SUBJECTIVE    Pain Level (on 0 to 10 scale):  3  / 10   Medication Changes/New allergies or changes in medical history, any new surgeries or procedures? YES    If yes, update Summary List   Subjective Functional Status/Changes:  []  No changes reported      The pt reports to PT with reports of pain in B shoulders (L>R) and elbows (R>L) and wrists and hands. The pt denies previous skilled care in PT/OT aside from PT from March to  this year to address related impingement and \"popping\" of B shoulders. The pt reports an increase in strength and mobility of her shoulders, but is still limited with pain especially in [place] and behind body positions. Pt reports having a diagnosis of RA for about a year and in addition to above mentioned symptoms, the pt has had pain and inflammation in the B knees (R>L) with plans to have a R TKA in October or November of this year. The pt lives with her son, and reports needing minimal assist with only living heavy items. The pt demonstrates visible inflammation with a 2-3 cm diameter swelling over the R wrist lateral epicondyle. Pt also demonstrates a hard end-feel with elbow extension limited by a mass over the oleacranon process.          OBJECTIVE    Physical Therapy Evaluation- Elbow    Upper Extremity ROM                        [] Unable to assess at this time   WNL N/A ROM as follows:    Left Shoulder [] []     Right Shoulder [] [] 150-160 deg elevation AROM, full PROM in all planes; IR HBB limited to sacrum on the L and L1 on the R. WNL N/A Flex Ext Sup Pro Pain   Left Elbow []  [] 153 -5/0 120 100 [x] Yes   [] No   Right Elbow [] [] 138/142 -20/-18 56/58 100 [x] Yes   [] No   Left Wrist [] [] -10/30 50/80 NA NA [x] Yes   [] No   Right Wrist [] [] 50/82 60/82 NA NA [x] Yes   [] No   *radial deviation on the R 10/20 deg; all other ulnar radial deviation B is 30 deg AROM  Upper Extremity Strength: (0-5)  [] Unable to assess at this time   WNL N/A Flex Ext IR ER Abd Add   L Shoulder [] [] 3+ 4 3+ 4 3+ 4   R Shoulder [] [] 4 4 4 4 4 4      WNL N/A Flex Ext Sup Pro Pain   Left Elbow [] [] 4 4 2 2 [] Yes   [] No   Right Elbow [] [] 4 4 2 2 [] Yes   [] No     Flexibility:                    [] Unable to assess at this time        Pain  Common Flexor Group WNL Min Mod Severe [] Yes   [] No   (L) Tightness = [] [] [] [] [] Yes   [] No   (R) Tightness =  [] [] [] [] [] Yes   [] No   Common Extensor Group     [] Yes   [] No   (L) Tightness = [] [] [] [] [] Yes   [] No   (R) Tightness = [] [] [] [] [] Yes   [] No     Palpation  [] Min  [] Mod  [] Severe    Location:  [] Min  [] Mod  [] Severe    Location:  [] Min  [] Mod  [] Severe    Location:         Strength:  Dynamometer position 2   Right (lbs) Left (lbs) Pain/location   Elbow Flexed: (90 deg) 15 18    Elbow Extended:  13 15      Optional Tests:  Resisted Finger Test:   2nd & 3rd finger extension (ECRB or ECRL):[] Neg   [] Pos  3rd finger extension (PIN): [] Neg   [] Pos    Neural Mobility Test:    Radial Nerve: [] Neg   [] Pos    Describe:  Median Nerve: [] Neg   [] Pos    Describe:  Ulnar Nerve:  [] Neg   [] Pos    Describe:      Modalities Rationale:        min [] Estim, type/location:                                      []  att     []  unatt     []  w/US     []  w/ice    []  w/heat    min []  Mechanical Traction: type/lbs                   []  pro   []  sup   []  int   []  cont    []  before manual    []  after manual    min []  Ultrasound, settings/location:      min []  Iontophoresis w/ dexamethasone, location:                                               []  take home patch       []  in clinic    min []  Ice     []  Heat    location/position:     min []  Vasopneumatic Device, press/temp:     min []  Other:    [] Skin assessment post-treatment (if applicable):    []  intact    []  redness- no adverse reaction     []redness  adverse reaction:      23 min Therapeutic Exercise:  [x]  See flow sheet   Rationale:      increase ROM and increase strength to improve the patients ability to perform ADL's with improved AROM. 8 min Therapeutic Activity: FOTO administration, review of appropriate ergonomics with reading her tablet and cleaning ADL's   Rationale: To determine a functional baseline from which to build a therapeutic exercise program.    8 min Manual Therapy: PROM and AAROM all planes of elbow and wrist motion   Rationale:      decrease pain, increase ROM and increase tissue extensibility to improve patient's ability to perform ADL's with improved flexibility     min Patient Education:  YES  Reviewed HEP   []  Progressed/Changed HEP based on:         Other Objective/Functional Measures:         Post Treatment Pain Level (on 0 to 10) scale:   4  / 10     ASSESSMENT    Assessment/Changes in Function:     Justification for Eval Code Complexity: MODERATE  Patient History (low 0, mod 1-2, high 3-4): 2 year history of wrist and hand cramping, 1 year history of RA in B UE's and R knee, anemia, low potassium levels resulting in orthostatic hypotension and dizzy related mutliple falls, chronic LBP, 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, osteoporosis HIGH  Examination (low 1-2, mod 3+, high 4+): B shoulder, elbow, wrist AROM and MMT HIGH   Clinical Presentation (low: stable/uncomplicated; mod: evolving; high: unstable/unpredictable): evolving AROM with PROM MODERATE  Clinical Decision Making (low , mod 26-74, high 1-25): 40 MODERATE     []  See Progress Note/Recertification Patient will continue to benefit from skilled PT services to modify and progress therapeutic interventions, address functional mobility deficits, address ROM deficits, address strength deficits, analyze and address soft tissue restrictions, analyze and cue movement patterns, analyze and modify body mechanics/ergonomics and assess and modify postural abnormalities to attain remaining goals. to attain remaining goals.    Progress toward goals / Updated goals:    See POC     PLAN    []  Upgrade activities as tolerated YES Continue plan of care   []  Discharge due to :    []  Other:      Therapist: Cassandra Nieto    Date: 9/27/2017 Time: 10:19 AM

## 2017-09-27 NOTE — PROGRESS NOTES
Chrissy Kingsley 29  [facility] PHYSICAL THERAPY  [address] [facility] [address], [address] - Phone: [phone]  Fax: [phone] / [address] [place] [employer]  Patient Name: Ed Boss : 1965   Medical   Diagnosis: Bilateral elbow joint pain [M25.521, M25.522]  Bilateral wrist pain [M25.531, M25.532] Treatment Diagnosis: RA and related loss of AROM and MMT in B shoulders/elbows/wrist/hand   Onset Date: 2016     Referral Source: Otis Martínez [facility]): 2017   Prior Hospitalization: See medical history Provider #: [id number]   Prior Level of Function: Independent with ADL's. Comorbidities: 2 year history of wrist and hand cramping, 1 year history of RA in B UE's and R knee, anemia, low potassium levels resulting in orthostatic hypotension and dizzy related mutliple falls, chronic LBP, 3 C/S fusions last in  extending from C2-C7, osteoporosis   Medications: Verified on Patient Summary List   The Plan of Care and following information is based on the information from the initial evaluation.   ===========================================================================================  Assessment / key information:  Ed Boss is a 45 y.o.  female with Dx: Bilateral elbow joint pain [M25.521, M25.522]  Bilateral wrist pain [M25.531, M25.532], signs and symptoms consistent with RA and related loss of AROM and MMT in B shoulders/elbows/wrist/hand. The pt reports to PT with reports of pain in B shoulders (L>R) and elbows (R>L) and wrists and hands. The pt also reports C/S radicular symptoms into B hands daily. The pt denies previous skilled care in PT/OT aside from PT from March to  this year to address related impingement and \"popping\" of B shoulders. The pt reports an increase in strength and mobility of her shoulders, but is still limited with pain especially in [place] and behind body positions.  Pt reports having a diagnosis of RA for about a year and in addition to above mentioned symptoms, the pt has had pain and inflammation in the B knees (R>L) with plans to have a R TKA in October or November of this year. The pt lives with her son, and reports needing minimal assist with only living heavy items. Objective: The pt demonstrates visible inflammation with a 2-3 cm diameter swelling over the R wrist lateral epicondyle. Pt also demonstrates a hard end-feel with elbow extension limited by a mass over the oleacranon process. The pt is grossly limited in R elbow extension and L wrist extension, in addition to deficits in the other planes of motions detailed below. The pt demonstrates decreased  strength B and reports dropping items out of her hands with cooking and cleaning ADL's. The pt also cannot fully extend digits 1-5 B and reports pain and difficulty with making a fist B.      Strength:  Dynamometer position 2    Right (lbs) Left (lbs)   Elbow Flexed: (90 deg) 15 18   Elbow Extended:  13 15     Upper Extremity ROM                                                                                                                                                    [] Unable to assess at this time    WNL N/A ROM as follows:    Left Shoulder [] []      Right Shoulder [] [] 150-160 deg elevation AROM, full PROM in all planes; IR HBB limited to sacrum on the L and L1 on the R.        WNL N/A Flex Ext Sup Pro Pain   Left Elbow []  [] 153 -5/0 120 100 [x] Yes   [] No   Right Elbow [] [] 138/142 -20/-18 56/58 100 [x] Yes   [] No   Left Wrist [] [] -10/30 50/80 NA NA [x] Yes   [] No   Right Wrist [] [] 50/82 60/82 NA NA [x] Yes   [] No   *radial deviation on the R 10/20 deg; all other ulnar radial deviation B is 30 deg AROM    Upper Extremity Strength: (0-5)  [] Unable to assess at this time    WNL N/A Flex Ext IR ER Abd Add   L Shoulder [] [] 3+ 4 3+ 4 3+ 4   R Shoulder [] [] 4 4 4 4 4 4        WNL N/A Flex Ext Sup Pro Pain   Left Elbow [] [] 4 4 2 2 [] Yes   [] No   Right Elbow [] [] 4 4 2 2 [] Yes   []      FOTO score 40 points indicating 60% limitation in functional ability. Patient would benefit from skilled PT to address the below listed impairments. Thank you for your referral.  ===========================================================================================  Eval Complexity: History: HIGH Complexity :3+ comorbidities / personal factors will impact the outcome/ POC Exam:HIGH Complexity : 4+ Standardized tests and measures addressing body structure, function, activity limitation and / or participation in recreation  Presentation: MEDIUM Complexity : Evolving with changing characteristics  Clinical Decision Making:MEDIUM Complexity : FOTO score of 26-74Overall Complexity:MEDIUM    Problem List: pain affecting function, decrease ROM, decrease strength, edema affecting function, impaired gait/ balance, decrease ADL/ functional abilitiies, decrease activity tolerance, decrease flexibility/ joint mobility and decrease transfer abilities   Treatment Plan may include any combination of the following: Therapeutic exercise, Therapeutic activities, Neuromuscular re-education, Physical agent/modality, Gait/balance training, Manual therapy, Aquatic therapy, Patient education, Self Care training, Functional mobility training, Home safety training and Stair training  Patient / Family readiness to learn indicated by: asking questions, trying to perform skills and interest  Persons(s) to be included in education: patient (P)  Barriers to Learning/Limitations: None  Measures taken: na   Patient Goal (s): \"To have less pain and difficulty with opening jars and dropping things in the house. \"   Patient self reported health status: good  Rehabilitation Potential: good   Short Term Goals: To be accomplished in  2-3  weeks:  1. Patient will demonstrate compliance with HEP for symptom management at home.   2. Patient will demonstrate at least 10 deg of L wrist flexion to increase efficiency with gripping ADL's.  3. Patient will centralize B hand radicular sx to level of C/S to demonstrate effectiveness of directional preference exercises and decrease risk of C/S dysfunction.  Long Term Goals: To be accomplished in  4-6  weeks:  1. Patient will be independent with HEP to self-manage and prevent symptoms upon DC. 2.  Patient will improve FOTO score by at least 16 points to indicate improved functional status. 3.  Patient will demonstrate at least 20 lb of  strength B to increase efficiency with cooking and cleaning ADL's. Frequency / Duration:   Patient to be seen  2-3  times per week for 4-6  weeks: **Although the pt would benefit from  strengthening exercises, elbow and wrist PROM/AROM and management of radicular symptoms (all which can also be addressed in OT), the pt reports her R elbow symptoms as the most limiting. The pt's hard end-feel with R elbow extension would be best addrssed with dynamic splinting provided by an occupational therapist or dynasplint. **    Patient / Caregiver education and instruction: self care, activity modification and exercises  G-Codes (GP): NA    Therapist Signature: Talha Marrero DPT Date: 2/72/7327   Certification Period: NA Time: 1:23 PM   ===========================================================================================  I certify that the above Physical Therapy Services are being furnished while the patient is under my care. I agree with the treatment plan and certify that this therapy is necessary. Physician Signature:        Date:       Time:     Please sign and return to [employer] or you may fax the signed copy to [phone]. Thank you.

## 2017-10-03 ENCOUNTER — HOSPITAL ENCOUNTER (OUTPATIENT)
Dept: PHYSICAL THERAPY | Age: 52
Discharge: HOME | End: 2017-10-03
Payer: OTHER GOVERNMENT

## 2017-10-03 PROCEDURE — 97110 THERAPEUTIC EXERCISES: CPT

## 2017-10-03 NOTE — PROGRESS NOTES
PHYSICAL THERAPY - DAILY TREATMENT NOTE    Patient Name: Serg Hawthorne        Date: 10/3/2017  : 1965   YES Patient  Verified  Visit #:   2     Insurance: Payor:  / Plan: Hari Vyas AND DEPENDENTS / Product Type:  /      In time: 2:00 Out time: 3:00   Total Treatment Time: 60     Medicare Time Tracking (below)   Total Timed Codes (min):  NA 1:1 Treatment Time:  NA     TREATMENT AREA =  Bilateral elbow joint pain [M25.521, M25.522]  Bilateral wrist pain [M25.531, M25.532]    SUBJECTIVE    Pain Level (on 0 to 10 scale):  6  / 10   Medication Changes/New allergies or changes in medical history, any new surgeries or procedures? NO     If yes, update Summary List   Subjective Functional Status/Changes:  []  No changes reported     \"I have a hard time sleeping and I'm having a hard time not getting depressed about all of the changes in my body. I just got back from the rheumatologist who says my RA is getting worse. I don't know what I should do. \"          OBJECTIVE    60 min Therapeutic Exercise:  [x]  See flow sheet   Rationale:      increase ROM and increase strength to improve the patients ability to perform ADL's with improved thoracic mobility and UE AROM. min Patient Education:  YES  Reviewed HEP   []  Progressed/Changed HEP based on:   Patient reports compliance     Other Objective/Functional Measures:    Pt has pain in the L levator scapulae with performance of C/S retractions. Retrarctions decreased L hand radicular symptoms, but made no progress on centralization. Addrssed L -sided neck symptoms with appropriate stretches and performed B wrist/ strengthening exercises with elbow AAROM. Post Treatment Pain Level (on 0 to 10) scale:   6  / 10     ASSESSMENT    Assessment/Changes in Function:     Pt presents to PT able to initiate an exercise program; although with slow progress in symptom management.      []  See Progress Note/Recertification   Patient will continue to benefit from skilled PT services to modify and progress therapeutic interventions, address functional mobility deficits, address ROM deficits, address strength deficits, analyze and address soft tissue restrictions, analyze and cue movement patterns, analyze and modify body mechanics/ergonomics and assess and modify postural abnormalities to attain remaining goals.    Progress toward goals / Updated goals:    First follow-up since eval.     PLAN    [x]  Upgrade activities as tolerated YES Continue plan of care   []  Discharge due to :    []  Other:      Therapist: Harini De Paz    Date: 10/3/2017 Time: 4:48 PM     Future Appointments  Date Time Provider Magy Keith   10/10/2017 10:00 AM [facility]   10/13/2017 3:00 PM [facility]   10/17/2017 11:00 AM [facility]   10/19/2017 1:00 PM [facility]   10/24/2017 11:00 AM [facility]   10/26/2017 11:00 AM [facility]   10/31/2017 11:00 AM [facility]

## 2017-10-04 ENCOUNTER — APPOINTMENT (OUTPATIENT)
Dept: PHYSICAL THERAPY | Age: 52
End: 2017-10-04
Payer: OTHER GOVERNMENT

## 2017-10-10 ENCOUNTER — APPOINTMENT (OUTPATIENT)
Dept: PHYSICAL THERAPY | Age: 52
End: 2017-10-10
Payer: OTHER GOVERNMENT

## 2017-10-13 ENCOUNTER — HOSPITAL ENCOUNTER (OUTPATIENT)
Dept: PHYSICAL THERAPY | Age: 52
Discharge: HOME | End: 2017-10-13
Payer: OTHER GOVERNMENT

## 2017-10-13 PROCEDURE — 97110 THERAPEUTIC EXERCISES: CPT

## 2017-10-13 NOTE — PROGRESS NOTES
PHYSICAL THERAPY - DAILY TREATMENT NOTE    Patient Name: Isatu Lowe        Date: 10/13/2017  : 1965   YES Patient  Verified  Visit #:   3     Insurance: Payor:  / Plan: [address] DEPENDENTS / Product Type:  /      In time: 3:10 Out time: 3:50   Total Treatment Time: 40     TREATMENT AREA = Bilateral elbow joint pain [M25.521, M25.522]  Bilateral wrist pain [M25.531, M25.532]    SUBJECTIVE    Pain Level (on 0 to 10 scale):  6  / 10   Medication Changes/New allergies or changes in medical history, any new surgeries or procedures? NO    If yes, update Summary List   Subjective Functional Status/Changes:  []  No changes reported     \"My hands hurt and my R elbow cracks all the time. I'm surprised my phone has lasted this long b/c I've kept dropping it. \"          OBJECTIVE    40 min Therapeutic Exercise:  [x]  See flow sheet   Rationale:      increase ROM, increase strength/stability, facilitate proper motor control. Throughout Rx min Patient Education:  YES   Reviewed HEP   []  Progressed/Changed HEP based on:   Display of proper form in clinic.   Improvement in condition and complaints     Other Objective/Functional Measures:    Treatment limited by pain and neuromuscular fatigue     Post Treatment Pain Level (on 0 to 10) scale:    10     ASSESSMENT    Assessment/Changes in Function:     No significant change in progression     []  See Progress Note/Recertification   Patient will continue to benefit from skilled PT services to modify and progress therapeutic interventions, address functional mobility deficits, address ROM deficits, address strength deficits, analyze and address soft tissue restrictions, analyze and cue movement patterns, analyze and modify body mechanics/ergonomics and instruct in home and community integration  to attain remaining goals   Progress toward goals / Updated goals:    Compliant with HEP     PLAN    [x]  Upgrade activities as tolerated YES Continue plan of care   []  Discharge due to :    []  Other:      Therapist: Mina Damon \"BJ\" SILVIO Harman, Cert. MDT, Cert. DN, Cert.  SMT    Date: 10/13/2017 Time: 3:13 PM   Future Appointments  Date Time Provider Magy Keith   10/17/2017 [facility] AM [facility]   10/19/2017 1:00 PM [facility], [facility]   10/24/2017 11:00 AM [facility], [facility]   10/26/2017 11:00 AM [facility], [facility]   10/31/2017 11:00 AM [facility], [facility]

## 2017-10-17 ENCOUNTER — APPOINTMENT (OUTPATIENT)
Dept: PHYSICAL THERAPY | Age: 52
End: 2017-10-17
Payer: OTHER GOVERNMENT

## 2017-10-17 NOTE — PROGRESS NOTES
Chrissy Kingsley 29  [facility] PHYSICAL THERAPY  [address] [facility] [address], [address] - Phone: [phone]  Fax: [phone]      Dear Dr. Radha Penaloza*,   Under your direction, we have been providing physical therapy for your patient Zoila Tierney, for a diagnosis of Bilateral elbow joint pain [M25.521, M25.522]  Bilateral wrist pain [M25.531, M25.532]. The patient was scheduled for several visits after her initial evaluation. She attended two of her follow up sessions, and was last seen on 10/13/17. The pt called to report she is having surgery and wishes to be discharged from PT at this time. Due to the inability to further her care from non-compliance to our attendance policy and POC, we are discharging the patient from physical therapy at this time. .     We appreciate the kind referral and would willingly work with this patient again, should she be able to arrange regular attendance and is appropriate for further treatment. Your patient's health is our primary concern. If you have any questions/comments please contact us directly at [phone]. Thank you for allowing us to assist in the care of your patient. Therapist Signature: Hyacinth Ribeiro DPT Date: 10/17/2017   Reporting Period 9/27/17 - 10/13/17 Time: 8:01 AM   NOTE TO PHYSICIAN:  PLEASE COMPLETE THE ORDERS BELOW AND FAX TO   [facility] [phone]  If you are unable to process this request in 24 hours please contact our office: [phone]    ___ I have read the above report and request that my patient continue as recommended.   ___ I have read the above report and request that my patient continue therapy with the following changes/special instructions:_________________________________________________________   ___ I have read the above report and request that my patient be discharged from therapy.      Physician Signature: Date:                                                                     Time:                                                           Please sign and return to [employer] or you may fax the signed copy to [phone].   Thank you.

## 2017-10-19 ENCOUNTER — APPOINTMENT (OUTPATIENT)
Dept: PHYSICAL THERAPY | Age: 52
End: 2017-10-19
Payer: OTHER GOVERNMENT

## 2017-10-24 ENCOUNTER — APPOINTMENT (OUTPATIENT)
Dept: PHYSICAL THERAPY | Age: 52
End: 2017-10-24
Payer: OTHER GOVERNMENT

## 2017-10-26 ENCOUNTER — APPOINTMENT (OUTPATIENT)
Dept: PHYSICAL THERAPY | Age: 52
End: 2017-10-26
Payer: OTHER GOVERNMENT

## 2017-10-31 ENCOUNTER — APPOINTMENT (OUTPATIENT)
Dept: PHYSICAL THERAPY | Age: 52
End: 2017-10-31
Payer: OTHER GOVERNMENT

## 2017-11-01 ENCOUNTER — HOSPITAL ENCOUNTER (OUTPATIENT)
Dept: PREADMISSION TESTING | Age: 52
Discharge: HOME | End: 2017-11-01
Payer: OTHER GOVERNMENT

## 2017-11-01 ENCOUNTER — HOSPITAL ENCOUNTER (OUTPATIENT)
Dept: GENERAL RADIOLOGY | Age: 52
Discharge: HOME | End: 2017-11-01
Payer: OTHER GOVERNMENT

## 2017-11-01 DIAGNOSIS — M17.11 OSTEOARTHRITIS OF RIGHT KNEE: ICD-10-CM

## 2017-11-01 DIAGNOSIS — Z01.818 PRE-OP TESTING: ICD-10-CM

## 2017-11-01 LAB
ABO + RH BLD: NORMAL
ALBUMIN SERPL-MCNC: 3.4 G/DL (ref 3.4–5)
ALBUMIN/GLOB SERPL: 1 {RATIO} (ref 0.8–1.7)
ALP SERPL-CCNC: 81 U/L (ref 45–117)
ALT SERPL-CCNC: 20 U/L (ref 13–56)
ANION GAP SERPL CALC-SCNC: 7 MMOL/L (ref 3–18)
APPEARANCE UR: CLEAR
APTT PPP: 29.2 SEC (ref 23–36.4)
AST SERPL-CCNC: 11 U/L (ref 15–37)
BASOPHILS # BLD: 0 K/UL (ref 0–0.06)
BASOPHILS NFR BLD: 0 % (ref 0–3)
BILIRUB SERPL-MCNC: 0.2 MG/DL (ref 0.2–1)
BILIRUB UR QL: NEGATIVE
BLOOD GROUP ANTIBODIES SERPL: NORMAL
BUN SERPL-MCNC: 9 MG/DL (ref 7–18)
BUN/CREAT SERPL: 15 (ref 12–20)
CALCIUM SERPL-MCNC: 9 MG/DL (ref 8.5–10.1)
CHLORIDE SERPL-SCNC: 104 MMOL/L (ref 100–108)
CO2 SERPL-SCNC: 33 MMOL/L (ref 21–32)
COLOR UR: YELLOW
CREAT SERPL-MCNC: 0.62 MG/DL (ref 0.6–1.3)
DIFFERENTIAL METHOD BLD: ABNORMAL
EOSINOPHIL # BLD: 0.1 K/UL (ref 0–0.4)
EOSINOPHIL NFR BLD: 1 % (ref 0–5)
ERYTHROCYTE [DISTWIDTH] IN BLOOD BY AUTOMATED COUNT: 13.7 % (ref 11.6–14.5)
GLOBULIN SER CALC-MCNC: 3.3 G/DL (ref 2–4)
GLUCOSE SERPL-MCNC: 100 MG/DL (ref 74–99)
GLUCOSE UR STRIP.AUTO-MCNC: NEGATIVE MG/DL
HCT VFR BLD AUTO: 42.2 % (ref 35–45)
HGB BLD-MCNC: 14 G/DL (ref 12–16)
HGB UR QL STRIP: NEGATIVE
INR PPP: 0.9 (ref 0.8–1.2)
KETONES UR QL STRIP.AUTO: NEGATIVE MG/DL
LEUKOCYTE ESTERASE UR QL STRIP.AUTO: NEGATIVE
LYMPHOCYTES # BLD: 2.2 K/UL (ref 0.8–3.5)
LYMPHOCYTES NFR BLD: 24 % (ref 20–51)
MCH RBC QN AUTO: 32 PG (ref 24–34)
MCHC RBC AUTO-ENTMCNC: 33.2 G/DL (ref 31–37)
MCV RBC AUTO: 96.3 FL (ref 74–97)
MONOCYTES # BLD: 0.6 K/UL (ref 0–1)
MONOCYTES NFR BLD: 7 % (ref 2–9)
NEUTS BAND NFR BLD MANUAL: 5 % (ref 0–5)
NEUTS SEG # BLD: 6.2 K/UL (ref 1.8–8)
NEUTS SEG NFR BLD: 63 % (ref 42–75)
NITRITE UR QL STRIP.AUTO: NEGATIVE
PH UR STRIP: 5.5 [PH] (ref 5–8)
PLATELET # BLD AUTO: 278 K/UL (ref 135–420)
PLATELET COMMENTS,PCOM: ABNORMAL
PMV BLD AUTO: 9.1 FL (ref 9.2–11.8)
POTASSIUM SERPL-SCNC: 2.8 MMOL/L (ref 3.5–5.5)
PROT SERPL-MCNC: 6.7 G/DL (ref 6.4–8.2)
PROT UR STRIP-MCNC: NEGATIVE MG/DL
PROTHROMBIN TIME: 11.9 SEC (ref 11.5–15.2)
RBC # BLD AUTO: 4.38 M/UL (ref 4.2–5.3)
RBC MORPH BLD: ABNORMAL
SODIUM SERPL-SCNC: 144 MMOL/L (ref 136–145)
SP GR UR REFRACTOMETRY: 1.02 (ref 1–1.03)
SPECIMEN EXP DATE BLD: NORMAL
UROBILINOGEN UR QL STRIP.AUTO: 0.2 EU/DL (ref 0.2–1)
WBC # BLD AUTO: 9.1 K/UL (ref 4.6–13.2)

## 2017-11-01 PROCEDURE — 36415 COLL VENOUS BLD VENIPUNCTURE: CPT | Performed by: ORTHOPAEDIC SURGERY

## 2017-11-01 PROCEDURE — 87086 URINE CULTURE/COLONY COUNT: CPT | Performed by: ORTHOPAEDIC SURGERY

## 2017-11-01 PROCEDURE — 85025 COMPLETE CBC W/AUTO DIFF WBC: CPT | Performed by: ORTHOPAEDIC SURGERY

## 2017-11-01 PROCEDURE — 71020 XR CHEST PA LAT: CPT

## 2017-11-01 PROCEDURE — 85730 THROMBOPLASTIN TIME PARTIAL: CPT | Performed by: ORTHOPAEDIC SURGERY

## 2017-11-01 PROCEDURE — 86900 BLOOD TYPING SEROLOGIC ABO: CPT | Performed by: ORTHOPAEDIC SURGERY

## 2017-11-01 PROCEDURE — 93005 ELECTROCARDIOGRAM TRACING: CPT

## 2017-11-01 PROCEDURE — 85610 PROTHROMBIN TIME: CPT | Performed by: ORTHOPAEDIC SURGERY

## 2017-11-01 PROCEDURE — 80053 COMPREHEN METABOLIC PANEL: CPT | Performed by: ORTHOPAEDIC SURGERY

## 2017-11-01 PROCEDURE — 81003 URINALYSIS AUTO W/O SCOPE: CPT | Performed by: ORTHOPAEDIC SURGERY

## 2017-11-01 RX ORDER — CYANOCOBALAMIN 1000 UG/ML
1000 INJECTION, SOLUTION INTRAMUSCULAR; SUBCUTANEOUS
COMMUNITY

## 2017-11-01 RX ORDER — VENLAFAXINE 75 MG/1
75 TABLET ORAL 2 TIMES DAILY
COMMUNITY
Start: 2017-09-04

## 2017-11-01 RX ORDER — LEUCOVORIN CALCIUM 10 MG/1
10 TABLET ORAL
COMMUNITY
Start: 2017-08-14 | End: 2017-11-12

## 2017-11-01 RX ORDER — POTASSIUM CHLORIDE 1500 MG/1
40 TABLET, EXTENDED RELEASE ORAL
COMMUNITY
Start: 2017-09-21

## 2017-11-01 RX ORDER — DEXTROAMPHETAMINE SULFATE, DEXTROAMPHETAMINE SACCHARATE, AMPHETAMINE SULFATE AND AMPHETAMINE ASPARTATE 7.5; 7.5; 7.5; 7.5 MG/1; MG/1; MG/1; MG/1
60 CAPSULE, EXTENDED RELEASE ORAL DAILY
COMMUNITY
Start: 2017-08-11

## 2017-11-01 RX ORDER — ZOLPIDEM TARTRATE 10 MG/1
10 TABLET ORAL
Refills: 0 | COMMUNITY
Start: 2017-08-28 | End: 2018-08-30

## 2017-11-01 RX ORDER — ABATACEPT 125 MG/ML
125 INJECTION, SOLUTION SUBCUTANEOUS
COMMUNITY
Start: 2017-09-14 | End: 2017-11-12

## 2017-11-01 RX ORDER — TOPIRAMATE 50 MG/1
50 TABLET, FILM COATED ORAL
COMMUNITY
Start: 2017-10-05 | End: 2020-01-06

## 2017-11-01 RX ORDER — GABAPENTIN 300 MG/1
900 CAPSULE ORAL
COMMUNITY
Start: 2017-09-22 | End: 2020-01-06

## 2017-11-01 NOTE — PROGRESS NOTES
aKrma Zepeda attended the Joint Replacement Pre-Operative class on 11/1/17. Topics discussed included surgery preparation, what to expect the day of surgery, medications, physical and occupational therapy, and discharge planning. It was discussed that this is considered an elective surgery and that prior to the surgery they need to make decisions such as arranging for help at home once they are discharged. She was given a knee patient education notebook to take home. Opportunity was given to ask questions and phone number of the Orthopaedic Nurse Clinician was given for any questions or concerns that may arise later.

## 2017-11-02 LAB
ATRIAL RATE: 75 BPM
BACTERIA SPEC CULT: NORMAL
CALCULATED R AXIS, ECG10: 34 DEGREES
CALCULATED T AXIS, ECG11: 28 DEGREES
DIAGNOSIS, 93000: NORMAL
P-R INTERVAL, ECG05: 158 MS
Q-T INTERVAL, ECG07: 400 MS
QRS DURATION, ECG06: 94 MS
QTC CALCULATION (BEZET), ECG08: 446 MS
SERVICE CMNT-IMP: NORMAL
VENTRICULAR RATE, ECG03: 75 BPM

## 2017-11-09 ENCOUNTER — ANESTHESIA EVENT (OUTPATIENT)
Dept: SURGERY | Age: 52
DRG: 470 | End: 2017-11-09
Payer: OTHER GOVERNMENT

## 2017-11-10 ENCOUNTER — HOSPITAL ENCOUNTER (INPATIENT)
Age: 52
LOS: 2 days | Discharge: HOME HEALTH | DRG: 470 | End: 2017-11-12
Attending: ORTHOPAEDIC SURGERY | Admitting: ORTHOPAEDIC SURGERY
Payer: OTHER GOVERNMENT

## 2017-11-10 ENCOUNTER — ANESTHESIA (OUTPATIENT)
Dept: SURGERY | Age: 52
DRG: 470 | End: 2017-11-10
Payer: OTHER GOVERNMENT

## 2017-11-10 ENCOUNTER — APPOINTMENT (OUTPATIENT)
Dept: GENERAL RADIOLOGY | Age: 52
DRG: 470 | End: 2017-11-10
Attending: ORTHOPAEDIC SURGERY
Payer: OTHER GOVERNMENT

## 2017-11-10 PROBLEM — M17.9 KNEE OSTEOARTHRITIS: Status: ACTIVE | Noted: 2017-11-10

## 2017-11-10 LAB
ANION GAP SERPL CALC-SCNC: 6 MMOL/L (ref 3–18)
BUN SERPL-MCNC: 17 MG/DL (ref 7–18)
BUN/CREAT SERPL: 29 (ref 12–20)
CALCIUM SERPL-MCNC: 8.3 MG/DL (ref 8.5–10.1)
CHLORIDE SERPL-SCNC: 107 MMOL/L (ref 100–108)
CO2 SERPL-SCNC: 28 MMOL/L (ref 21–32)
CREAT SERPL-MCNC: 0.59 MG/DL (ref 0.6–1.3)
GLUCOSE SERPL-MCNC: 89 MG/DL (ref 74–99)
HCG UR QL: NEGATIVE
POTASSIUM SERPL-SCNC: 4.4 MMOL/L (ref 3.5–5.5)
SODIUM SERPL-SCNC: 141 MMOL/L (ref 136–145)

## 2017-11-10 PROCEDURE — 0SRC0J9 REPLACEMENT OF RIGHT KNEE JOINT WITH SYNTHETIC SUBSTITUTE, CEMENTED, OPEN APPROACH: ICD-10-PCS | Performed by: ORTHOPAEDIC SURGERY

## 2017-11-10 PROCEDURE — 77030010785: Performed by: ORTHOPAEDIC SURGERY

## 2017-11-10 PROCEDURE — 77030013708 HC HNDPC SUC IRR PULS STRY –B: Performed by: ORTHOPAEDIC SURGERY

## 2017-11-10 PROCEDURE — 77030012890: Performed by: ORTHOPAEDIC SURGERY

## 2017-11-10 PROCEDURE — 77030031139 HC SUT VCRL2 J&J -A: Performed by: ORTHOPAEDIC SURGERY

## 2017-11-10 PROCEDURE — 77030012935 HC DRSG AQUACEL BMS -B: Performed by: ORTHOPAEDIC SURGERY

## 2017-11-10 PROCEDURE — 81025 URINE PREGNANCY TEST: CPT

## 2017-11-10 PROCEDURE — 74011250636 HC RX REV CODE- 250/636: Performed by: NURSE ANESTHETIST, CERTIFIED REGISTERED

## 2017-11-10 PROCEDURE — 77030027138 HC INCENT SPIROMETER -A

## 2017-11-10 PROCEDURE — 65270000029 HC RM PRIVATE

## 2017-11-10 PROCEDURE — 74011000250 HC RX REV CODE- 250: Performed by: ORTHOPAEDIC SURGERY

## 2017-11-10 PROCEDURE — 77030020782 HC GWN BAIR PAWS FLX 3M -B: Performed by: ORTHOPAEDIC SURGERY

## 2017-11-10 PROCEDURE — 77030021370 HC WRP CLD THER ICMN DJOR -B: Performed by: ORTHOPAEDIC SURGERY

## 2017-11-10 PROCEDURE — 77030012406 HC DRN WND PENRS BARD -A: Performed by: ORTHOPAEDIC SURGERY

## 2017-11-10 PROCEDURE — 77030033067 HC SUT PDO STRATFX SPIR J&J -B: Performed by: ORTHOPAEDIC SURGERY

## 2017-11-10 PROCEDURE — 77030008683 HC TU ET CUF COVD -A: Performed by: ANESTHESIOLOGY

## 2017-11-10 PROCEDURE — 77030034479 HC ADH SKN CLSR PRINEO J&J -B: Performed by: ORTHOPAEDIC SURGERY

## 2017-11-10 PROCEDURE — 64447 NJX AA&/STRD FEMORAL NRV IMG: CPT | Performed by: ANESTHESIOLOGY

## 2017-11-10 PROCEDURE — C1713 ANCHOR/SCREW BN/BN,TIS/BN: HCPCS | Performed by: ORTHOPAEDIC SURGERY

## 2017-11-10 PROCEDURE — 77030018836 HC SOL IRR NACL ICUM -A: Performed by: ORTHOPAEDIC SURGERY

## 2017-11-10 PROCEDURE — 77030002934 HC SUT MCRYL J&J -B: Performed by: ORTHOPAEDIC SURGERY

## 2017-11-10 PROCEDURE — 74011250636 HC RX REV CODE- 250/636

## 2017-11-10 PROCEDURE — 77030002933 HC SUT MCRYL J&J -A: Performed by: ORTHOPAEDIC SURGERY

## 2017-11-10 PROCEDURE — 77030000032 HC CUF TRNQT ZIMM -B: Performed by: ORTHOPAEDIC SURGERY

## 2017-11-10 PROCEDURE — 97161 PT EVAL LOW COMPLEX 20 MIN: CPT

## 2017-11-10 PROCEDURE — 77030011640 HC PAD GRND REM COVD -A: Performed by: ORTHOPAEDIC SURGERY

## 2017-11-10 PROCEDURE — 76210000006 HC OR PH I REC 0.5 TO 1 HR: Performed by: ORTHOPAEDIC SURGERY

## 2017-11-10 PROCEDURE — 77030026438 HC STYL ET INTUB CARD -A: Performed by: ANESTHESIOLOGY

## 2017-11-10 PROCEDURE — 3E0T3BZ INTRODUCTION OF ANESTHETIC AGENT INTO PERIPHERAL NERVES AND PLEXI, PERCUTANEOUS APPROACH: ICD-10-PCS | Performed by: ANESTHESIOLOGY

## 2017-11-10 PROCEDURE — 76942 ECHO GUIDE FOR BIOPSY: CPT | Performed by: ANESTHESIOLOGY

## 2017-11-10 PROCEDURE — 76060000035 HC ANESTHESIA 2 TO 2.5 HR: Performed by: ORTHOPAEDIC SURGERY

## 2017-11-10 PROCEDURE — C9290 INJ, BUPIVACAINE LIPOSOME: HCPCS | Performed by: ORTHOPAEDIC SURGERY

## 2017-11-10 PROCEDURE — 77030013079 HC BLNKT BAIR HGGR 3M -A: Performed by: ORTHOPAEDIC SURGERY

## 2017-11-10 PROCEDURE — 73560 X-RAY EXAM OF KNEE 1 OR 2: CPT

## 2017-11-10 PROCEDURE — 77030034850: Performed by: ORTHOPAEDIC SURGERY

## 2017-11-10 PROCEDURE — 76010000171 HC OR TIME 2 TO 2.5 HR INTENSV-TIER 1: Performed by: ORTHOPAEDIC SURGERY

## 2017-11-10 PROCEDURE — 77030029494 HC CLD THER UNIT ICMN DJOR -B: Performed by: ORTHOPAEDIC SURGERY

## 2017-11-10 PROCEDURE — C1776 JOINT DEVICE (IMPLANTABLE): HCPCS | Performed by: ORTHOPAEDIC SURGERY

## 2017-11-10 PROCEDURE — 74011250636 HC RX REV CODE- 250/636: Performed by: ORTHOPAEDIC SURGERY

## 2017-11-10 PROCEDURE — 77030032490 HC SLV COMPR SCD KNE COVD -B: Performed by: ORTHOPAEDIC SURGERY

## 2017-11-10 PROCEDURE — 74011250637 HC RX REV CODE- 250/637: Performed by: NURSE ANESTHETIST, CERTIFIED REGISTERED

## 2017-11-10 PROCEDURE — 80048 BASIC METABOLIC PNL TOTAL CA: CPT | Performed by: ORTHOPAEDIC SURGERY

## 2017-11-10 PROCEDURE — 77030019605: Performed by: ORTHOPAEDIC SURGERY

## 2017-11-10 PROCEDURE — 74011000258 HC RX REV CODE- 258

## 2017-11-10 PROCEDURE — 74011000250 HC RX REV CODE- 250

## 2017-11-10 PROCEDURE — 97530 THERAPEUTIC ACTIVITIES: CPT

## 2017-11-10 PROCEDURE — 77030003601 HC NDL NRV BLK BBMI -A: Performed by: ORTHOPAEDIC SURGERY

## 2017-11-10 PROCEDURE — 36415 COLL VENOUS BLD VENIPUNCTURE: CPT | Performed by: ORTHOPAEDIC SURGERY

## 2017-11-10 PROCEDURE — 74011250637 HC RX REV CODE- 250/637: Performed by: ORTHOPAEDIC SURGERY

## 2017-11-10 PROCEDURE — 77030025371 HC PIN FIX QD SIG J&J -D: Performed by: ORTHOPAEDIC SURGERY

## 2017-11-10 PROCEDURE — 74011000258 HC RX REV CODE- 258: Performed by: ORTHOPAEDIC SURGERY

## 2017-11-10 PROCEDURE — 77030016544 HC BLD SAW RECIP1 STRY -B: Performed by: ORTHOPAEDIC SURGERY

## 2017-11-10 DEVICE — ATTUNE KNEE SYSTEM FEMORAL POSTERIOR STABILIZED NARROW SIZE 5N RIGHT CEMENTED
Type: IMPLANTABLE DEVICE | Site: KNEE | Status: FUNCTIONAL
Brand: ATTUNE

## 2017-11-10 DEVICE — INSERT TIB RP FEM KNEE CEM: Type: IMPLANTABLE DEVICE | Site: KNEE | Status: FUNCTIONAL

## 2017-11-10 DEVICE — ATTUNE PATELLA MEDIALIZED ANATOMIC 35MM CEMENTED AOX
Type: IMPLANTABLE DEVICE | Site: KNEE | Status: FUNCTIONAL
Brand: ATTUNE

## 2017-11-10 DEVICE — ATTUNE KNEE SYSTEM TIBIAL INSERT FIXED BEARING POSTERIOR STABILIZED 5 5MM AOX
Type: IMPLANTABLE DEVICE | Site: KNEE | Status: FUNCTIONAL
Brand: ATTUNE

## 2017-11-10 DEVICE — SMARTSET GMV HIGH PERFORMANCE GENTAMICIN MEDIUM VISCOSITY BONE CEMENT 40G
Type: IMPLANTABLE DEVICE | Site: KNEE | Status: FUNCTIONAL
Brand: SMARTSET

## 2017-11-10 RX ORDER — GLYCOPYRROLATE 0.2 MG/ML
INJECTION INTRAMUSCULAR; INTRAVENOUS AS NEEDED
Status: DISCONTINUED | OUTPATIENT
Start: 2017-11-10 | End: 2017-11-10 | Stop reason: HOSPADM

## 2017-11-10 RX ORDER — ACETAMINOPHEN 500 MG
1000 TABLET ORAL
Status: COMPLETED | OUTPATIENT
Start: 2017-11-10 | End: 2017-11-10

## 2017-11-10 RX ORDER — PREGABALIN 75 MG/1
75 CAPSULE ORAL
Status: COMPLETED | OUTPATIENT
Start: 2017-11-10 | End: 2017-11-10

## 2017-11-10 RX ORDER — ACETAMINOPHEN 500 MG
1000 TABLET ORAL EVERY 8 HOURS
Status: DISCONTINUED | OUTPATIENT
Start: 2017-11-10 | End: 2017-11-12 | Stop reason: HOSPADM

## 2017-11-10 RX ORDER — SODIUM CHLORIDE, SODIUM LACTATE, POTASSIUM CHLORIDE, CALCIUM CHLORIDE 600; 310; 30; 20 MG/100ML; MG/100ML; MG/100ML; MG/100ML
75 INJECTION, SOLUTION INTRAVENOUS CONTINUOUS
Status: DISCONTINUED | OUTPATIENT
Start: 2017-11-10 | End: 2017-11-10 | Stop reason: HOSPADM

## 2017-11-10 RX ORDER — ONDANSETRON 4 MG/1
4-8 TABLET, ORALLY DISINTEGRATING ORAL
Status: DISCONTINUED | OUTPATIENT
Start: 2017-11-10 | End: 2017-11-12 | Stop reason: HOSPADM

## 2017-11-10 RX ORDER — SODIUM CHLORIDE 0.9 % (FLUSH) 0.9 %
5-10 SYRINGE (ML) INJECTION EVERY 8 HOURS
Status: DISCONTINUED | OUTPATIENT
Start: 2017-11-10 | End: 2017-11-12 | Stop reason: HOSPADM

## 2017-11-10 RX ORDER — OXYCODONE HYDROCHLORIDE 5 MG/1
5-15 TABLET ORAL
Status: DISCONTINUED | OUTPATIENT
Start: 2017-11-10 | End: 2017-11-12 | Stop reason: HOSPADM

## 2017-11-10 RX ORDER — SODIUM CHLORIDE 0.9 % (FLUSH) 0.9 %
5-10 SYRINGE (ML) INJECTION EVERY 8 HOURS
Status: DISCONTINUED | OUTPATIENT
Start: 2017-11-10 | End: 2017-11-10 | Stop reason: HOSPADM

## 2017-11-10 RX ORDER — ONDANSETRON 2 MG/ML
INJECTION INTRAMUSCULAR; INTRAVENOUS AS NEEDED
Status: DISCONTINUED | OUTPATIENT
Start: 2017-11-10 | End: 2017-11-10 | Stop reason: HOSPADM

## 2017-11-10 RX ORDER — SODIUM CHLORIDE 0.9 % (FLUSH) 0.9 %
5-10 SYRINGE (ML) INJECTION AS NEEDED
Status: DISCONTINUED | OUTPATIENT
Start: 2017-11-10 | End: 2017-11-10 | Stop reason: HOSPADM

## 2017-11-10 RX ORDER — ROPIVACAINE HYDROCHLORIDE 2 MG/ML
30 INJECTION, SOLUTION EPIDURAL; INFILTRATION; PERINEURAL
Status: COMPLETED | OUTPATIENT
Start: 2017-11-10 | End: 2017-11-10

## 2017-11-10 RX ORDER — MAGNESIUM SULFATE 100 %
4 CRYSTALS MISCELLANEOUS AS NEEDED
Status: DISCONTINUED | OUTPATIENT
Start: 2017-11-10 | End: 2017-11-10 | Stop reason: HOSPADM

## 2017-11-10 RX ORDER — SUCCINYLCHOLINE CHLORIDE 20 MG/ML
INJECTION INTRAMUSCULAR; INTRAVENOUS AS NEEDED
Status: DISCONTINUED | OUTPATIENT
Start: 2017-11-10 | End: 2017-11-10 | Stop reason: HOSPADM

## 2017-11-10 RX ORDER — CEFAZOLIN SODIUM 2 G/50ML
2 SOLUTION INTRAVENOUS ONCE
Status: COMPLETED | OUTPATIENT
Start: 2017-11-10 | End: 2017-11-10

## 2017-11-10 RX ORDER — VENLAFAXINE 50 MG/1
75 TABLET ORAL 2 TIMES DAILY WITH MEALS
Status: DISCONTINUED | OUTPATIENT
Start: 2017-11-10 | End: 2017-11-12 | Stop reason: HOSPADM

## 2017-11-10 RX ORDER — OXYCODONE HCL 20 MG/1
20 TABLET, FILM COATED, EXTENDED RELEASE ORAL ONCE
Status: COMPLETED | OUTPATIENT
Start: 2017-11-10 | End: 2017-11-10

## 2017-11-10 RX ORDER — NEOSTIGMINE METHYLSULFATE 5 MG/5 ML
SYRINGE (ML) INTRAVENOUS AS NEEDED
Status: DISCONTINUED | OUTPATIENT
Start: 2017-11-10 | End: 2017-11-10 | Stop reason: HOSPADM

## 2017-11-10 RX ORDER — LIDOCAINE HYDROCHLORIDE 20 MG/ML
INJECTION, SOLUTION EPIDURAL; INFILTRATION; INTRACAUDAL; PERINEURAL AS NEEDED
Status: DISCONTINUED | OUTPATIENT
Start: 2017-11-10 | End: 2017-11-10 | Stop reason: HOSPADM

## 2017-11-10 RX ORDER — CEFAZOLIN SODIUM 2 G/50ML
2 SOLUTION INTRAVENOUS EVERY 8 HOURS
Status: COMPLETED | OUTPATIENT
Start: 2017-11-10 | End: 2017-11-11

## 2017-11-10 RX ORDER — INSULIN LISPRO 100 [IU]/ML
INJECTION, SOLUTION INTRAVENOUS; SUBCUTANEOUS ONCE
Status: DISCONTINUED | OUTPATIENT
Start: 2017-11-10 | End: 2017-11-10 | Stop reason: HOSPADM

## 2017-11-10 RX ORDER — CELECOXIB 100 MG/1
200 CAPSULE ORAL 2 TIMES DAILY
Status: DISCONTINUED | OUTPATIENT
Start: 2017-11-10 | End: 2017-11-12 | Stop reason: HOSPADM

## 2017-11-10 RX ORDER — FAMOTIDINE 20 MG/1
20 TABLET, FILM COATED ORAL ONCE
Status: COMPLETED | OUTPATIENT
Start: 2017-11-10 | End: 2017-11-10

## 2017-11-10 RX ORDER — DEXTROAMPHETAMINE SACCHARATE, AMPHETAMINE ASPARTATE MONOHYDRATE, DEXTROAMPHETAMINE SULFATE AND AMPHETAMINE SULFATE 2.5; 2.5; 2.5; 2.5 MG/1; MG/1; MG/1; MG/1
60 CAPSULE, EXTENDED RELEASE ORAL
Status: DISCONTINUED | OUTPATIENT
Start: 2017-11-10 | End: 2017-11-12 | Stop reason: HOSPADM

## 2017-11-10 RX ORDER — ROCURONIUM BROMIDE 10 MG/ML
INJECTION, SOLUTION INTRAVENOUS AS NEEDED
Status: DISCONTINUED | OUTPATIENT
Start: 2017-11-10 | End: 2017-11-10 | Stop reason: HOSPADM

## 2017-11-10 RX ORDER — SODIUM CHLORIDE 9 MG/ML
125 INJECTION, SOLUTION INTRAVENOUS CONTINUOUS
Status: DISPENSED | OUTPATIENT
Start: 2017-11-10 | End: 2017-11-10

## 2017-11-10 RX ORDER — DEXTROSE 50 % IN WATER (D50W) INTRAVENOUS SYRINGE
25-50 AS NEEDED
Status: DISCONTINUED | OUTPATIENT
Start: 2017-11-10 | End: 2017-11-10 | Stop reason: HOSPADM

## 2017-11-10 RX ORDER — FENTANYL CITRATE 50 UG/ML
INJECTION, SOLUTION INTRAMUSCULAR; INTRAVENOUS AS NEEDED
Status: DISCONTINUED | OUTPATIENT
Start: 2017-11-10 | End: 2017-11-10 | Stop reason: HOSPADM

## 2017-11-10 RX ORDER — HYDROMORPHONE HYDROCHLORIDE 2 MG/ML
0.5 INJECTION, SOLUTION INTRAMUSCULAR; INTRAVENOUS; SUBCUTANEOUS AS NEEDED
Status: DISCONTINUED | OUTPATIENT
Start: 2017-11-10 | End: 2017-11-10 | Stop reason: HOSPADM

## 2017-11-10 RX ORDER — MIDAZOLAM HYDROCHLORIDE 1 MG/ML
2 INJECTION, SOLUTION INTRAMUSCULAR; INTRAVENOUS ONCE
Status: COMPLETED | OUTPATIENT
Start: 2017-11-10 | End: 2017-11-10

## 2017-11-10 RX ORDER — LIDOCAINE HYDROCHLORIDE 10 MG/ML
0.1 INJECTION, SOLUTION EPIDURAL; INFILTRATION; INTRACAUDAL; PERINEURAL AS NEEDED
Status: DISCONTINUED | OUTPATIENT
Start: 2017-11-10 | End: 2017-11-10 | Stop reason: HOSPADM

## 2017-11-10 RX ORDER — ONDANSETRON 2 MG/ML
4 INJECTION INTRAMUSCULAR; INTRAVENOUS ONCE
Status: DISCONTINUED | OUTPATIENT
Start: 2017-11-10 | End: 2017-11-10 | Stop reason: HOSPADM

## 2017-11-10 RX ORDER — FENTANYL CITRATE 50 UG/ML
100 INJECTION, SOLUTION INTRAMUSCULAR; INTRAVENOUS ONCE
Status: COMPLETED | OUTPATIENT
Start: 2017-11-10 | End: 2017-11-10

## 2017-11-10 RX ORDER — SODIUM CHLORIDE 0.9 % (FLUSH) 0.9 %
5-10 SYRINGE (ML) INJECTION AS NEEDED
Status: DISCONTINUED | OUTPATIENT
Start: 2017-11-10 | End: 2017-11-12 | Stop reason: HOSPADM

## 2017-11-10 RX ORDER — VANCOMYCIN HYDROCHLORIDE 1 G/20ML
INJECTION, POWDER, LYOPHILIZED, FOR SOLUTION INTRAVENOUS AS NEEDED
Status: DISCONTINUED | OUTPATIENT
Start: 2017-11-10 | End: 2017-11-10 | Stop reason: HOSPADM

## 2017-11-10 RX ORDER — GABAPENTIN 300 MG/1
300 CAPSULE ORAL 2 TIMES DAILY
Status: DISCONTINUED | OUTPATIENT
Start: 2017-11-10 | End: 2017-11-12 | Stop reason: HOSPADM

## 2017-11-10 RX ORDER — NALOXONE HYDROCHLORIDE 0.4 MG/ML
0.4 INJECTION, SOLUTION INTRAMUSCULAR; INTRAVENOUS; SUBCUTANEOUS AS NEEDED
Status: DISCONTINUED | OUTPATIENT
Start: 2017-11-10 | End: 2017-11-12 | Stop reason: HOSPADM

## 2017-11-10 RX ADMIN — ROCURONIUM BROMIDE 5 MG: 10 INJECTION, SOLUTION INTRAVENOUS at 07:47

## 2017-11-10 RX ADMIN — HYDROMORPHONE HYDROCHLORIDE 0.5 MG: 2 INJECTION, SOLUTION INTRAMUSCULAR; INTRAVENOUS; SUBCUTANEOUS at 10:29

## 2017-11-10 RX ADMIN — CEFAZOLIN SODIUM 2 G: 2 SOLUTION INTRAVENOUS at 07:40

## 2017-11-10 RX ADMIN — CEFAZOLIN SODIUM 2 G: 2 SOLUTION INTRAVENOUS at 22:52

## 2017-11-10 RX ADMIN — ACETAMINOPHEN 1000 MG: 500 TABLET, COATED ORAL at 15:36

## 2017-11-10 RX ADMIN — ROPIVACAINE HYDROCHLORIDE 60 MG: 2 INJECTION, SOLUTION EPIDURAL; INFILTRATION at 07:15

## 2017-11-10 RX ADMIN — CELECOXIB 200 MG: 100 CAPSULE ORAL at 12:58

## 2017-11-10 RX ADMIN — SODIUM CHLORIDE 125 ML/HR: 900 INJECTION, SOLUTION INTRAVENOUS at 11:56

## 2017-11-10 RX ADMIN — GABAPENTIN 300 MG: 300 CAPSULE ORAL at 12:59

## 2017-11-10 RX ADMIN — Medication 3 MG: at 09:10

## 2017-11-10 RX ADMIN — GLYCOPYRROLATE 0.6 MG: 0.2 INJECTION INTRAMUSCULAR; INTRAVENOUS at 09:10

## 2017-11-10 RX ADMIN — FENTANYL CITRATE 50 MCG: 50 INJECTION, SOLUTION INTRAMUSCULAR; INTRAVENOUS at 07:47

## 2017-11-10 RX ADMIN — PREGABALIN 75 MG: 75 CAPSULE ORAL at 06:53

## 2017-11-10 RX ADMIN — FENTANYL CITRATE 50 MCG: 50 INJECTION, SOLUTION INTRAMUSCULAR; INTRAVENOUS at 08:43

## 2017-11-10 RX ADMIN — ONDANSETRON 4 MG: 2 INJECTION INTRAMUSCULAR; INTRAVENOUS at 08:13

## 2017-11-10 RX ADMIN — LIDOCAINE HYDROCHLORIDE 60 MG: 20 INJECTION, SOLUTION EPIDURAL; INFILTRATION; INTRACAUDAL; PERINEURAL at 07:47

## 2017-11-10 RX ADMIN — TRANEXAMIC ACID 1 G: 100 INJECTION, SOLUTION INTRAVENOUS at 07:55

## 2017-11-10 RX ADMIN — SODIUM CHLORIDE, SODIUM LACTATE, POTASSIUM CHLORIDE, AND CALCIUM CHLORIDE 75 ML/HR: 600; 310; 30; 20 INJECTION, SOLUTION INTRAVENOUS at 10:29

## 2017-11-10 RX ADMIN — MIDAZOLAM HYDROCHLORIDE 2 MG: 1 INJECTION, SOLUTION INTRAMUSCULAR; INTRAVENOUS at 07:10

## 2017-11-10 RX ADMIN — CEFAZOLIN SODIUM 2 G: 2 SOLUTION INTRAVENOUS at 15:36

## 2017-11-10 RX ADMIN — ACETAMINOPHEN 1000 MG: 500 TABLET ORAL at 06:53

## 2017-11-10 RX ADMIN — SODIUM CHLORIDE, SODIUM LACTATE, POTASSIUM CHLORIDE, AND CALCIUM CHLORIDE 75 ML/HR: 600; 310; 30; 20 INJECTION, SOLUTION INTRAVENOUS at 06:56

## 2017-11-10 RX ADMIN — RIVAROXABAN 10 MG: 10 TABLET, FILM COATED ORAL at 15:36

## 2017-11-10 RX ADMIN — FENTANYL CITRATE 50 MCG: 50 INJECTION INTRAMUSCULAR; INTRAVENOUS at 07:10

## 2017-11-10 RX ADMIN — Medication 10 ML: at 22:52

## 2017-11-10 RX ADMIN — FAMOTIDINE 20 MG: 20 TABLET, FILM COATED ORAL at 06:53

## 2017-11-10 RX ADMIN — SUCCINYLCHOLINE CHLORIDE 100 MG: 20 INJECTION INTRAMUSCULAR; INTRAVENOUS at 07:47

## 2017-11-10 RX ADMIN — TRANEXAMIC ACID 1 G: 100 INJECTION, SOLUTION INTRAVENOUS at 09:08

## 2017-11-10 RX ADMIN — ACETAMINOPHEN 1000 MG: 500 TABLET, COATED ORAL at 22:52

## 2017-11-10 RX ADMIN — OXYCODONE HYDROCHLORIDE 5 MG: 5 TABLET ORAL at 12:58

## 2017-11-10 RX ADMIN — OXYCODONE HYDROCHLORIDE 20 MG: 20 TABLET, FILM COATED, EXTENDED RELEASE ORAL at 06:53

## 2017-11-10 NOTE — IP AVS SNAPSHOT
[address] Patient: Antoni Carrera MRN: [id number] EIC:1/9/6501 About your hospitalization You were admitted on:  November 10, 2017 You last received care in the:  [facility] 1982 You were discharged on:  November 12, 2017 Why you were hospitalized Your primary diagnosis was:  Not on File Your diagnoses also included:  Knee Osteoarthritis Things You Need To Do (next 8 weeks) Follow up with Rupa Isaac MD  
  
Phone:  [phone] Where:  [address], [address] Monday Nov 13, 2017 START OF CARE with Darron Ureña RN at  5:00 AM  
Where:  [address] SCHEDULING/INTAKE ([facility]) Discharge Orders None A check bharath indicates which time of day the medication should be taken. My Medications STOP taking these medications   
 leucovorin calcium 10 mg tablet Commonly known as:  WELLCOVORIN  
   
  
 ORENCIA CLICKJECT 737 mg/mL Atin Generic drug:  abatacept RASUVO (PF) 20 mg/0.4 mL Atin Generic drug:  methotrexate (PF) TAKE these medications as instructed Instructions Each Dose to Equal  
 Morning Noon Evening Bedtime  
 acetaminophen 500 mg tablet Commonly known as:  TYLENOL Your last dose was: Your next dose is: Take 2 Tabs by mouth every eight (8) hours for 7 days. Indications: Pain  
 1000 mg ADDERALL XR 30 mg XR capsule Generic drug:  amphetamine-dextroamphetamine XR Your last dose was: Your next dose is: Take 60 mg by mouth daily. 60 mg  
    
   
   
   
  
 celecoxib 200 mg capsule Commonly known as:  CELEBREX Your last dose was: Your next dose is: Take 1 Cap by mouth two (2) times a day for 21 days.  Indications: Postoperative Acute Pain 200 mg FORTEO 20 mcg/dose - 600 mcg/2.4 mL Pnij injection Generic drug:  teriparatide Your last dose was: Your next dose is:    
   
   
 20 mcg by SubCUTAneous route daily. 20 mcg  
    
   
   
   
  
 gabapentin 300 mg capsule Commonly known as:  NEURONTIN Your last dose was: Your next dose is: Take 600 mg by mouth nightly. 600 mg KLOR-CON M20 20 mEq tablet Generic drug:  potassium chloride Your last dose was: Your next dose is: Take 20 mEq by mouth daily (with dinner). 20 mEq  
    
   
   
   
  
 ondansetron 4 mg disintegrating tablet Commonly known as:  ZOFRAN ODT Your last dose was: Your next dose is: Take 1-2 Tabs by mouth every eight (8) hours as needed. Indications: PREVENTION OF POST-OPERATIVE NAUSEA AND VOMITING  
 4-8 mg  
    
   
   
   
  
 oxyCODONE IR 5 mg immediate release tablet Commonly known as:  Benjaman Hurl Your last dose was: Your next dose is: Take 1-3 Tabs by mouth every four (4) hours as needed. Max Daily Amount: 90 mg.  
 5-15 mg  
    
   
   
   
  
 rivaroxaban 10 mg tablet Commonly known as:  [place] Your last dose was: Your next dose is: Take 1 Tab by mouth daily (with breakfast) for 21 days. Indications: KNEE REPLACEMENT DEEP VEIN THROMBOSIS PREVENTION  
 10 mg  
    
   
   
   
  
 topiramate 50 mg tablet Commonly known as:  TOPAMAX Your last dose was: Your next dose is: Take 50 mg by mouth nightly. 50 mg  
    
   
   
   
  
 venlafaxine 75 mg tablet Commonly known as:  [facility] Your last dose was: Your next dose is: Take 75 mg by mouth two (2) times a day. Indications: hot flashes 75 mg  
    
   
   
   
  
 VITAMIN B-12 1,000 mcg/mL injection Generic drug:  cyanocobalamin Your last dose was: Your next dose is:    
   
   
 1,000 mcg by IntraMUSCular route every month. 1000 mcg  
    
   
   
   
  
 zolpidem 10 mg tablet Commonly known as:  AMBIEN Your last dose was: Your next dose is: Take 10 mg by mouth nightly. 10 mg Where to Get Your Medications Information on where to get these meds will be given to you by the nurse or doctor. ! Ask your nurse or doctor about these medications  
  acetaminophen 500 mg tablet  
 celecoxib 200 mg capsule  
 ondansetron 4 mg disintegrating tablet  
 oxyCODONE IR 5 mg immediate release tablet  
 rivaroxaban 10 mg tablet Discharge Instructions Discharge Instructions for Total Knee Replacement Patients · The dressing on your knee will be changed by the Home Health professional at the appropriate time. Keep your incision clean and dry. Do not apply any ointments to the incision. · You may shower as long as you keep you incision dry. When showering, leave your dressing on. The dressing is waterproof as long as the edges are sealed. · Notify your surgeon if: 
· Your temperature is greater than 100.5 · You have pain not controlled by your pain medication · You have increased drainage from your incision · You have increased redness or swelling in your leg · You have chest pain, shortness of breath, or any other problems · Do your exercises as instructed by the home physical therapist. 
 
· Bend and straighten your operative leg every hour. Walk once an hour during normal walking hours. · During periods of inactivity or rest, your leg should be elevated with a rolled towel or folded pillow under the heel to keep the knee straight. · Do not place anything under the knee. · Do not sit in a recliner chair with the footrest elevated. · You may use ice to your knee for 23-30 minutes after exercise and as needed. Do not apply the ice pack directly to your skin. Use a barrier such as your pant leg or a thin towel. · If you have PRECIOUS hose (the white support stockings), remove them at bedtime and re-apply the hose in the morning for the next 2 weeks. Best of luck with your new knee and [address] YOU for choosing the [address]! [employer] Activation Thank you for requesting access to [employer]. Please follow the instructions below to securely access and download your online medical record. Ex[employer]ct allows you to send messages to your doctor, view your test results, renew your prescriptions, schedule appointments, and more. How Do I Sign Up? 1. In your internet browser, go to [email or web address] 
2. Click on the First Time User? Click Here link in the Sign In box. You will be redirect to the New Member Sign Up page. 3. Enter your Exosect Access Code exactly as it appears below. You will not need to use this code after youve completed the sign-up process. If you do not sign up before the expiration date, you must request a new code. Exosect Access Code: Activation code not generated Current Exosect Status: Patient Declined (This is the date your Exosect access code will ) 4. Enter the last four digits of your Social Security Number (xxxx) and Date of Birth (mm/dd/yyyy) as indicated and click Submit. You will be taken to the next sign-up page. 5. Create a Exosect ID. This will be your Exosect login ID and cannot be changed, so think of one that is secure and easy to remember. 6. Create a Exosect password. You can change your password at any time. 7. Enter your Password Reset Question and Answer. This can be used at a later time if you forget your password. 8. Enter your e-mail address. You will receive e-mail notification when new information is available in [address] 9. Click Sign Up. You can now view and download portions of your medical record. 10. Click the Download Summary menu link to download a portable copy of your medical information. Additional Information If you have questions, please visit the Frequently Asked Questions section of the [employer] website at [email or web address] Remember, [employer] is NOT to be used for urgent needs. For medical emergencies, dial 911. [employer] Announcement We are excited to announce that we are making your provider's discharge notes available to you in [employer]. You will see these notes when they are completed and signed by the physician that discharged you from your recent hospital stay. If you have any questions or concerns about any information you see in [employer], please call the Health Information Department where you were seen or reach out to your Primary Care Provider for more information about your plan of care. Providers Seen During Your Hospitalization Provider Specialty Primary office phone Bonna Meckel, MD Orthopedic Surgery [phone] Immunizations Administered for This Admission Name Date Influenza Vaccine (Quad) PF  Deferred () Your Primary Care Physician (PCP) Primary Care Physician Office Phone Office Fax Bonnie Tori [phone] You are allergic to the following Allergen Reactions Sulfa Dyne Rash Itching Recent Documentation Height Weight BMI OB Status Smoking Status 1.6 m 51.9 kg 20.27 kg/m2 Menopause Former Smoker Emergency Contacts Name Discharge Info Relation Home Work Mobile KatelynLefty DISCHARGE CAREGIVER [3] Spouse [3] [phone] Patient Belongings  The following personal items are in your possession at time of discharge: 
  Dental Appliances: None  Visual Aid: None          Jewelry: None Clothing: Footwear, Pants, Shirt, Undergarments    Other Valuables: None Please provide this summary of care documentation to your next provider. Signatures-by signing, you are acknowledging that this After Visit Summary has been reviewed with you and you have received a copy. Patient Signature:  ____________________________________________________________ Date:  ____________________________________________________________  
  
[facility] Perfect Provider Signature:  ____________________________________________________________ Date:  ____________________________________________________________

## 2017-11-10 NOTE — PROGRESS NOTES
1120 Received report from Cedric Aly [facility] Patient arrived to unit  1148 97.3; 67; 16; 99%; 102/70. Patient rated pain level a 4 on a scale of 0 to 10. Patient using incentive spirometer to level 100.

## 2017-11-10 NOTE — PROGRESS NOTES
conducted a pre-surgery visit with Antoni Carrera, who is a 45 y.o.,female. The  provided the following Interventions:  Initiated a relationship of care and support. Plan:  Chaplains will continue to follow and will provide pastoral care on an as needed/requested basis.  recommends bedside caregivers page  on duty if patient shows signs of acute spiritual or emotional distress.     [address] [facility] Certified [address] [place] [address]   [phone]

## 2017-11-10 NOTE — PERIOP NOTES
TRANSFER - OUT REPORT:    Verbal report given to [address] [place] [address] on [employer] [facility]  being transferred to [facility] for routine post - op       Report consisted of patients Situation, Background, Assessment and   Recommendations(SBAR). Information from the following report(s) SBAR, OR Summary, Procedure Summary, Intake/Output, MAR and Recent Results was reviewed with the receiving nurse. Lines:   Peripheral IV 11/10/17 Left Hand (Active)   Site Assessment Clean, dry, & intact 11/10/2017 10:22 AM   Phlebitis Assessment 0 11/10/2017 10:22 AM   Infiltration Assessment 0 11/10/2017 10:22 AM   Dressing Status Clean, dry, & intact 11/10/2017 10:22 AM   Dressing Type Transparent 11/10/2017 10:22 AM   Hub Color/Line Status Pink; Infusing;Patent 11/10/2017 10:22 AM        Opportunity for questions and clarification was provided.       Patient transported with:   Registered Nurse

## 2017-11-10 NOTE — ANESTHESIA PREPROCEDURE EVALUATION
Anesthetic History   No history of anesthetic complications            Review of Systems / Medical History  Patient summary reviewed and pertinent labs reviewed    Pulmonary  Within defined limits                 Neuro/Psych         Psychiatric history     Cardiovascular  Within defined limits                     GI/Hepatic/Renal                Endo/Other        Arthritis     Other Findings   Comments:   Risk Factors for Postoperative nausea/vomiting:       History of postoperative nausea/vomiting? NO       Female? YES       Motion sickness? NO       Intended opioid administration for postoperative analgesia? YES      Smoking Abstinence  Current Smoker? NO  Elective Surgery? YES  Seen preoperatively by anesthesiologist or proxy prior to day of surgery? YES  Pt abstained from smoking 24 hours prior to anesthesia?  N/A           Physical Exam    Airway  Mallampati: III  TM Distance: 4 - 6 cm  Neck ROM: decreased range of motion   Mouth opening: Diminished (comment)     Cardiovascular    Rhythm: regular  Rate: normal         Dental    Dentition: Upper partial plate and Poor dentition     Pulmonary  Breath sounds clear to auscultation               Abdominal  GI exam deferred       Other Findings            Anesthetic Plan    ASA: 2  Anesthesia type: general      Post-op pain plan if not by surgeon: peripheral nerve block single    Induction: Intravenous  Anesthetic plan and risks discussed with: Patient

## 2017-11-10 NOTE — ANESTHESIA POSTPROCEDURE EVALUATION
Post-Anesthesia Evaluation and Assessment    Patient: Karmen Watkins MRN: [id number]  SSN: [id number]    YOB: 1965  Age: 45 y.o. Sex: female     VS from flow sheet    Cardiovascular Function/Vital Signs  Visit Vitals    /76    Pulse 72    Temp 36.5 °C (97.7 °F)    Resp 14    Ht 5' 3\" (1.6 m)    Wt 51.9 kg (114 lb 6.4 oz)    SpO2 100%    BMI 20.27 kg/m2       Patient is status post general anesthesia for Procedure(s):  RIGHT TOTAL KNEE ARTHROPLASTY/DEPUY/2 SA'S/FEMORAL NERVE BLOCK. Nausea/Vomiting: None    Postoperative hydration reviewed and adequate. Pain:  Pain Scale 1: Visual (11/10/17 0953)  Pain Intensity 1: 0 (11/10/17 0953)   Managed    Neurological Status:   Neuro (WDL): Within Defined Limits (11/10/17 0953)   At baseline    Mental Status and Level of Consciousness: Arousable    Pulmonary Status:   O2 Device: Room air (11/10/17 1020)   Adequate oxygenation and airway patent    Complications related to anesthesia: None    Post-anesthesia assessment completed.  No concerns    Signed By: Nathan Lee MD     November 10, 2017

## 2017-11-10 NOTE — PROCEDURES
Pre-op DX: R knee inflammatory OA  Post-op DX: Same  Procedure: R Cemented tricompartmental TKA  Anesthesia: GETA, block, exparel  Surgeon: Lulu Parisi  Antibiotics: 2 g cefazolin  TT: 48 minutes  EBL: 50mL  Implants:  -Depuy attune size 5 RN PS femur  -Size 5 tibia  -5mm poly  -35mm patella    Plan:  -xarelto  -PT/OT  -cefazolin x 23 hours  -routine post-TKA care  -admit to ortho  -routine post-op care    Post-op note [id number]

## 2017-11-10 NOTE — OP NOTES
[address]    Name:  Dinora Becerra  MR#:  [id number]  :  1965  Account #:  [de-identified]  Date of Adm:  11/10/2017  Date of Surgery:  11/10/2017      PREOPERATIVE DIAGNOSIS: Right knee tricompartmental  inflammatory osteoarthritis. POSTOPERATIVE DIAGNOSIS: Right knee tricompartmental  inflammatory osteoarthritis. PROCEDURES PERFORMED: Right cemented fixed bearing posterior  stabilized tricompartmental total knee arthroplasty. ANESTHESIA:  1. General.  2. Femoral nerve block. 3. Exparel local anesthetic. SURGEON: Jose Mac MD    ASSISTANT: Oumou Mclaughlin    ESTIMATED BLOOD LOSS: 50 mL. ANTIBIOTICS: 2 grams cefazolin, 1 gram of vancomycin placed into  the wound. TOURNIQUET TIME: 48 minutes at 275 mmHg. IMPLANTS:  1. DePuy Attune size 5 narrow right posterior stabilized femur. 2. DePuy Attune size 5 fixed bearing tibial tray. 3. DePuy Attune 5 mm size 5 posterior stabilized fixed bearing  polyethylene. 4. 35 mm anatomic patella. SPECIMENS REMOVED: None. PROCEDURE AND FINDINGS: The patient was identified in the  preoperative holding area. The right leg was marked with indelible  marker. I reviewed the informed consent. Block was placed. She was  transported to the operative theater. SCDs were placed. Antibiotics  were administered. Anesthesia was induced. A bump was placed  under the right hip. A nonsterile tourniquet was placed on the right leg. The right leg was prepped and draped in the usual sterile fashion. Robust time-out was performed. The extremity exsanguinated, the  tourniquet inflated. A mid vastus approach to the right knee was performed. Skin and  subcutaneous tissues were dissected. There was previous synovial  scarring from her previous synovectomy. A mid vastus approach was  performed. A medium parapatellar arthrotomy was extended proximally  through the mid vastus medialis. The patella was subluxed.  The knee  was found to be significantly degenerative. A complete synovectomy  was performed. A central intramedullary sreedhar was drilled in the femur. A  5-degree valgus, 9 mm distal femoral cut was then performed. The  PCL was transected, the knee was hyperflexed and proximal tibia was  exposed. A 9 mm proximal tibial cut templated off of the lateral tibial plateau was  performed. This was orthogonal at the mechanical axis of the tibia. The  medial and lateral menisci were removed. The knee was brought in full  extension and we were found to have a symmetric extension gap with  a 5 mm spacer. There was excellent collateral ligament stability. Attention was then returned to the femur. The epicondylar axis was  marked. An anteriorly referenced sizing guide was placed, externally  rotated 3 degrees from the posterior condylar axis. This indicated a size 5  implant. The 4-in-1 cutting guide was placed. Our anterior cut was  checked and there was no evidence of femoral notching. Anterior cuts,  posterior cuts, anterior and posterior chamfers were then performed. Small posteromedial and posterolateral osteophytes were removed. Our flexion and extension gaps were found to be well balanced with a  5 mm spacer. A box cut for a size 5 femoral implant was then  performed. The trial femur was placed. Attention was then returned to the tibia. A size 5 tibial tray was  externally rotated to fill the posterolateral corner of the plateau. This  was found to be of appropriate alignment. This was reamed and  punched. A size 5 trial implant was then utilized. The knee was brought  in full extension, and we were able to achieve full terminal extension,  flexion beyond 120 degrees with excellent collateral ligament stability. Attention was then turned to the patella. The patella was 23 mm thick. A 10 mm cut was performed, leaving 13 mm remaining. A 35 mm  anatomic patella was drilled and trialed. There was excellent patellar  tracking. All trial components were removed. All cut surfaces were  thoroughly irrigated. The posteromedial and posterolateral capsule  were injected with Exparel for local anesthetic. Cementation of the  components was performed following meticulous drying of the cut  surfaces. The proximal tibia, followed by the distal femur, followed by  the patellar were cemented into place. Trial polyethylene was applied  and the knee was brought into full extension. The tourniquet was  deflated. Hemostasis was obtained. The wounds were thoroughly  irrigated with Betadine and normal saline. Following complete cement  curing, any excess cement was meticulously removed. The final  polyethylene was impacted into place, the knee was brought into full  extension. Periarticular tissues were injected with Exparel local  anesthetic. A drain was placed, exiting the superior lateral gutter. The  knee was brought into mid flexion. Final irrigation was performed. The  arthrotomy was reapproximated with 0 Ethibond suture, oversewn by a  #1 Stratafix suture, 0 Vicryl sutures were placed in the deep layers, 3-0  Monocryl suture in the superficial layer, and then Prineo was placed on  the skin. Sterile dressings were applied, incorporating a cooling device. The patient was awakened from anesthesia and transported to post-  anesthesia care unit in stable condition. All sponge and instrument  counts were correct at the end of the procedure.         MD Aye Timmons / Allen Winter  D:  11/10/2017   09:25  T:  11/10/2017   15:11  Job #:  [id number]

## 2017-11-10 NOTE — ANESTHESIA PROCEDURE NOTES
Peripheral Block    Start time: 11/10/2017 7:10 AM  End time: 11/10/2017 7:17 AM  Performed by: Sandra Talbot  Authorized by: Sandra Talbot       Pre-procedure:    Indications: at surgeon's request and post-op pain management    Preanesthetic Checklist: patient identified, risks and benefits discussed, site marked, timeout performed, anesthesia consent given and patient being monitored    Timeout Time: 07:10          Block Type:   Block Type:  Femoral single shot  Laterality:  Right  Monitoring:  Standard ASA monitoring, heart rate, continuous pulse ox, responsive to questions, frequent vital sign checks and oxygen  Injection Technique:  Single shot  Procedures: ultrasound guided    Patient Position: supine  Prep: chlorhexidine    Location:  Abdominal  Needle Type:  Ultraplex  Needle Gauge:  21 G  Needle Localization:  Ultrasound guidance  Medication Injected:  0.5%  ropivacaine  Volume (mL):  30    Assessment:  Number of attempts:  1  Injection Assessment:  Incremental injection every 5 mL, no paresthesia, ultrasound image on chart, no intravascular symptoms, negative aspiration for blood and local visualized surrounding nerve on ultrasound  Patient tolerance:  Patient tolerated the procedure well with no immediate complications  Signed by Dr. Amilcar Conn

## 2017-11-10 NOTE — H&P
History and Physical    Subjective:     Doris Keller is a 45 y.o. female presenting for elective R TKA. She had recent cardiac clearance. Rheumatologic medications have been held per rhemuatology. Past Medical History:   Diagnosis Date    ADHD     Anemia     Arthritis     Hammer toe of left foot     Hernia     Hypokalemia     Migraines     Psychiatric disorder     denies anxiety 17    Rheumatoid arthritis ([facility] [address]     Varicose veins       Past Surgical History:   Procedure Laterality Date    HX ARTHROTOMY Right 2016    knee    HX CERVICAL FUSION  2011    anterior C 3-5 decompression and fusion with cadaver bone and plate    HX CERVICAL FUSION  2012    Anterior C 5-6     HX CERVICAL LAMINECTOMY  08/10/2015    posterior C 3-7 bilateral foraminotiomies and C 2-3 partial laminectomies    HX  SECTION  1995    HX OTHER SURGICAL  2013    sinus surgery    HX OTHER SURGICAL Left 2016    foot    HX OTHER SURGICAL Right 2010    knee surgery    VASCULAR SURGERY PROCEDURE UNLIST       History reviewed. No pertinent family history. Social History   Substance Use Topics    Smoking status: Former Smoker     Types: Cigarettes    Smokeless tobacco: Never Used    Alcohol use Yes      Comment: rarely       Prior to Admission medications    Medication Sig Start Date End Date Taking? Authorizing Provider   ADDERALL XR 30 mg XR capsule Take 60 mg by mouth daily. 17  Yes Historical Provider   venlafaxine (EFFEXOR) 75 mg tablet Take 75 mg by mouth two (2) times a day. Indications: hot flashes 17  Yes Historical Provider   zolpidem (AMBIEN) 10 mg tablet Take 10 mg by mouth nightly. 17  Yes Historical Provider   leucovorin calcium (WELLCOVORIN) 10 mg tablet Take 10 mg by mouth every . 17  Yes Historical Provider   ORENCIA CLICKJECT 816 mg/mL atIn Take 125 mg by mouth every Wednesday.  17  Yes Historical Provider   gabapentin (NEURONTIN) 300 mg capsule Take 600 mg by mouth nightly. 9/22/17  Yes Historical Provider   topiramate (TOPAMAX) 50 mg tablet Take 50 mg by mouth nightly. 10/5/17  Yes Historical Provider   cyanocobalamin (VITAMIN B-12) 1,000 mcg/mL injection 1,000 mcg by IntraMUSCular route every month. Yes Historical Provider   KLOR-CON M20 20 mEq tablet Take 20 mEq by mouth daily (with dinner). 9/21/17  Yes Historical Provider   RASUVO, PF, 20 mg/0.4 mL atIn Every Saturday. 6/9/17  Yes Historical Provider   teriparatide (FORTEO) 20 mcg/dose - 600 mcg/2.4 mL pnij injection 20 mcg by SubCUTAneous route daily. Yes Historical Provider     Allergies   Allergen Reactions    Sulfa Dyne Rash and Itching        Review of Systems:  As per hPI    Objective:       Temp: 98 °F (36.7 °C) (11/10/17 0648) Pulse (Heart Rate): 71 (11/10/17 0648) Resp Rate: 11 (11/10/17 0648) BP: 122/80 (11/10/17 0648) O2 Sat (%): 100 % (11/10/17 0648) Weight: 51.9 kg (114 lb 6.4 oz) (11/10/17 0648)     R knee with previous scar. No effusion. ROM 0-120 degrees. No ligamentous instability        Assessment:     Active Problems:    Knee osteoarthritis (11/10/2017)        Plan:      Will proceed with elective TKA as scheduled    Signed By: Manny eKith MD     November 10, 2017

## 2017-11-10 NOTE — PROGRESS NOTES
Problem: Mobility Impaired (Adult and Pediatric)  Goal: *Acute Goals and Plan of Care (Insert Text)  Physical Therapy Goals  Initiated 11/10/2017 and to be accomplished within 7 day(s)  1. Patient will move from supine to sit and sit to supine  in bed with independence. 2.  Patient will transfer from bed to chair and chair to bed with modified independence using the least restrictive device. 3.  Patient will perform sit to stand with modified independence. 4.  Patient will ambulate with modified independence for 300 feet with the least restrictive device. 5.  Patient will ascend/descend 4 stairs with 1 handrail(s) with supervision/set-up. physical Therapy EVALUATION    Patient: Jewell Harris (45 y.o. female)  Date: 11/10/2017  Primary Diagnosis: Osteoarthritis of right knee, unspecified osteoarthritis type [M17.11]  Procedure(s) (LRB):  RIGHT TOTAL KNEE ARTHROPLASTY (Right) Day of Surgery   Precautions: fall       ASSESSMENT :  Based on the objective data described below, the patient presents with decreased strength, dynamic standing balance and R LE ROM resulting in decreased independence with functional mobility. Pt performed bed mobility at supervision level. Pt performed sit to stand transfers 4 times with CGA. Pt ambulated 7 feet with RW and min A, patients R knee was buckling with weight bearing and she was having difficulty using UEs to offload her leg. Pt was returned to supine in bed with needs in reach. Patient will benefit from skilled intervention to address the above impairments.   Patients rehabilitation potential is considered to be Good  Factors which may influence rehabilitation potential include:   []         None noted  []         Mental ability/status  [x]         Medical condition  []         Home/family situation and support systems  []         Safety awareness  []         Pain tolerance/management  []         Other:      PLAN :  Recommendations and Planned Interventions:  [x] Bed Mobility Training             [x]    Neuromuscular Re-Education  [x]           Transfer Training                   []    Orthotic/Prosthetic Training  [x]           Gait Training                          [x]    Modalities  [x]           Therapeutic Exercises          [x]    Edema Management/Control  [x]           Therapeutic Activities            [x]    Patient and Family Training/Education  []           Other (comment):    Frequency/Duration: Patient will be followed by physical therapy 1-2 times per day/4-7 days per week to address goals. Discharge Recommendations: Home Health  Further Equipment Recommendations for Discharge: rolling walker     G-CODES      Mobility  Current  CJ= 20-39%   Goal  CI= 1-19%. The severity rating is based on the Level of Assistance required for Functional Mobility and ADLs. Eval Complexity: History: HIGH Complexity :3+ comorbidities / personal factors will impact the outcome/ POC Exam:LOW Complexity : 1-2 Standardized tests and measures addressing body structure, function, activity limitation and / or participation in recreation  Presentation: MEDIUM Complexity : Evolving with changing characteristics  Clinical Decision Making:Low Complexity , Overall Complexity:LOW     SUBJECTIVE:   Patient stated I'm ready to move.     OBJECTIVE DATA SUMMARY:     Past Medical History:   Diagnosis Date    ADHD     Anemia     Arthritis     Hammer toe of left foot     Hernia     Hypokalemia     Migraines     Psychiatric disorder     denies anxiety 11/1/17    Rheumatoid arthritis ([facility] [address]     Varicose veins      Past Surgical History:   Procedure Laterality Date    HX ARTHROTOMY Right 04/2016    knee    HX CERVICAL FUSION  03/21/2011    anterior C 3-5 decompression and fusion with cadaver bone and plate    HX CERVICAL FUSION  01/02/2012    Anterior C 5-6     HX CERVICAL LAMINECTOMY  08/10/2015    posterior C 3-7 bilateral foraminotiomies and C 2-3 partial laminectomies    HX  SECTION  1995    HX OTHER SURGICAL  2013    sinus surgery    HX OTHER SURGICAL Left 2016    foot    HX OTHER SURGICAL Right 2010    knee surgery    VASCULAR SURGERY PROCEDURE UNLIST       Prior Level of Function/Home Situation: independent with mobility without an assistive device  Home Situation  # Steps to Enter: 1  One/Two Story Residence: Two story  # of Interior Steps: 15  Interior Rails: Both  Living Alone: No  Support Systems: Child(aye), Friends \ neighbors, Family member(s)  Patient Expects to be Discharged to[de-identified] Private residence  Current DME Used/Available at Home: None  Critical Behavior:   calm and cooperative   Strength:    Strength:  (knee leg with SLR on R)      Tone & Sensation:   Tone: Normal       Range Of Motion:  AROM:  (R knee 5 to 60 degrees)      Functional Mobility:  Bed Mobility:  Supine to Sit: Supervision  Sit to Supine: Supervision     Transfers:  Sit to Stand: Contact guard assistance  Stand to Sit: Contact guard assistance  Bed to Chair: Minimum assistance     Balance:   Sitting: Intact  Standing: With support  Standing - Static: Fair  Standing - Dynamic :  (fair-/poor+)  Ambulation/Gait Training:  Distance (ft): 7 Feet (ft)  Assistive Device: Walker, rolling  Gait Abnormalities: Decreased step clearance; Antalgic (R knee buckling)  Right Side Weight Bearing: As tolerated  Therapeutic Exercises: Ankle pumps, heel slides, quad sets, SLR x5  Pain:  Pt reports 3/10 pain or discomfort prior to treatment.    Pt reports 4/10 pain or discomfort post treatment. Activity Tolerance:   fair  Please refer to the flowsheet for vital signs taken during this treatment.   After treatment:   []         Patient left in no apparent distress sitting up in chair  [x]         Patient left in no apparent distress in bed  [x]         Call bell left within reach  [x]         Nursing notified  []         Caregiver present  []         Bed alarm activated    COMMUNICATION/EDUCATION:   [x]         Fall prevention education was provided and the patient/caregiver indicated understanding. [x]         Patient/family have participated as able in goal setting and plan of care. [x]         Patient/family agree to work toward stated goals and plan of care. []         Patient understands intent and goals of therapy, but is neutral about his/her participation. []         Patient is unable to participate in goal setting and plan of care.   Educated patient on exercises, transfers and calling for assistance to get up    Thank you for this referral.  Trish Cline, PT   Time Calculation: 27 mins

## 2017-11-11 LAB
HCT VFR BLD AUTO: 32 % (ref 35–45)
HGB BLD-MCNC: 10.2 G/DL (ref 12–16)

## 2017-11-11 PROCEDURE — 36415 COLL VENOUS BLD VENIPUNCTURE: CPT | Performed by: ORTHOPAEDIC SURGERY

## 2017-11-11 PROCEDURE — 85018 HEMOGLOBIN: CPT | Performed by: ORTHOPAEDIC SURGERY

## 2017-11-11 PROCEDURE — 74011250636 HC RX REV CODE- 250/636: Performed by: ORTHOPAEDIC SURGERY

## 2017-11-11 PROCEDURE — 65270000029 HC RM PRIVATE

## 2017-11-11 PROCEDURE — 97530 THERAPEUTIC ACTIVITIES: CPT

## 2017-11-11 PROCEDURE — 97110 THERAPEUTIC EXERCISES: CPT

## 2017-11-11 PROCEDURE — 74011250637 HC RX REV CODE- 250/637: Performed by: ORTHOPAEDIC SURGERY

## 2017-11-11 PROCEDURE — 97116 GAIT TRAINING THERAPY: CPT

## 2017-11-11 PROCEDURE — 97165 OT EVAL LOW COMPLEX 30 MIN: CPT

## 2017-11-11 RX ORDER — OXYCODONE HYDROCHLORIDE 5 MG/1
5-15 TABLET ORAL
Qty: 60 TAB | Refills: 0 | Status: SHIPPED | OUTPATIENT
Start: 2017-11-11 | End: 2020-01-06

## 2017-11-11 RX ORDER — CELECOXIB 200 MG/1
200 CAPSULE ORAL 2 TIMES DAILY
Qty: 42 CAP | Refills: 0 | Status: SHIPPED | OUTPATIENT
Start: 2017-11-11 | End: 2017-12-02

## 2017-11-11 RX ORDER — ACETAMINOPHEN 500 MG
1000 TABLET ORAL EVERY 8 HOURS
Qty: 42 TAB | Refills: 0 | Status: SHIPPED | OUTPATIENT
Start: 2017-11-11 | End: 2017-11-18

## 2017-11-11 RX ORDER — ONDANSETRON 4 MG/1
4-8 TABLET, ORALLY DISINTEGRATING ORAL
Qty: 15 TAB | Refills: 0 | Status: SHIPPED | OUTPATIENT
Start: 2017-11-11 | End: 2020-01-06

## 2017-11-11 RX ADMIN — GABAPENTIN 300 MG: 300 CAPSULE ORAL at 17:22

## 2017-11-11 RX ADMIN — CEFAZOLIN SODIUM 2 G: 2 SOLUTION INTRAVENOUS at 06:21

## 2017-11-11 RX ADMIN — DEXTROAMPHETAMINE SACCHARATE, AMPHETAMINE ASPARTATE MONOHYDRATE, DEXTROAMPHETAMINE SULFATE, AND AMPHETAMINE SULFATE 60 MG: 2.5; 2.5; 2.5; 2.5 CAPSULE ORAL at 10:29

## 2017-11-11 RX ADMIN — OXYCODONE HYDROCHLORIDE 15 MG: 5 TABLET ORAL at 23:03

## 2017-11-11 RX ADMIN — Medication 10 ML: at 14:00

## 2017-11-11 RX ADMIN — ACETAMINOPHEN 1000 MG: 500 TABLET, COATED ORAL at 21:33

## 2017-11-11 RX ADMIN — VENLAFAXINE HYDROCHLORIDE 75 MG: 37.5 TABLET ORAL at 10:29

## 2017-11-11 RX ADMIN — Medication 10 ML: at 21:34

## 2017-11-11 RX ADMIN — VENLAFAXINE HYDROCHLORIDE 75 MG: 37.5 TABLET ORAL at 17:22

## 2017-11-11 RX ADMIN — OXYCODONE HYDROCHLORIDE 10 MG: 5 TABLET ORAL at 17:28

## 2017-11-11 RX ADMIN — RIVAROXABAN 10 MG: 10 TABLET, FILM COATED ORAL at 12:49

## 2017-11-11 RX ADMIN — Medication 10 ML: at 04:25

## 2017-11-11 RX ADMIN — OXYCODONE HYDROCHLORIDE 10 MG: 5 TABLET ORAL at 04:23

## 2017-11-11 RX ADMIN — ACETAMINOPHEN 1000 MG: 500 TABLET, COATED ORAL at 06:22

## 2017-11-11 RX ADMIN — CELECOXIB 200 MG: 100 CAPSULE ORAL at 17:22

## 2017-11-11 RX ADMIN — CELECOXIB 200 MG: 100 CAPSULE ORAL at 10:30

## 2017-11-11 RX ADMIN — ACETAMINOPHEN 1000 MG: 500 TABLET, COATED ORAL at 17:22

## 2017-11-11 RX ADMIN — GABAPENTIN 300 MG: 300 CAPSULE ORAL at 10:29

## 2017-11-11 NOTE — PROGRESS NOTES
Mobility Intervention:       [x] Pt dangled at edge of bed    [x] Pt assisted OOB to bedside commode    [] Pt assisted OOB to chair    [] Pt ambulated to bathroom    [] Patient was ambulated in room/hallway    Assistive Device Utilized:       [x] Rolling walker   [] Crutches   [] Straight Cane   [] Knee immobilizer   [] IV pole    After Mobilization:     [] Patient left in no apparent distress sitting up in chair  [x] Patient left in no apparent distress in bed  [x] Call bell left within reach  [x] SCDs on & machine turned on  [x] Ice applied  [] RN notified  [] Caregiver present  [] Bed alarm activated    Reason patient not mobilized:      [] Patient refused   [] Nausea/vomiting   [] Low blood pressure   [] Drowsy/lethargic    Pain Rating:     [] 0  [] 1  Assistive Device:        [] 2  [x] 3 reposition and ice  [] 4  [] 5  [] 6  Assistive Device:        [] 7  [] 8  [] 9  [] 10    Comments:

## 2017-11-11 NOTE — PROGRESS NOTES
Problem: Self Care Deficits Care Plan (Adult)  Goal: *Acute Goals and Plan of Care (Insert Text)  Occupational Therapy EVALUATION/discharge    Patient: Sheila Woods (45 y.o. female)  Date: 11/11/2017  Primary Diagnosis: Osteoarthritis of right knee, unspecified osteoarthritis type [M17.11]  Procedure(s) (LRB):  RIGHT TOTAL KNEE ARTHROPLASTY (Right) 1 Day Post-Op   Precautions: WBAT       ASSESSMENT AND RECOMMENDATIONS:  Based on the objective data described below, the patient presents with out functional deficits; therefore, skilled occupational therapy is not indicated at this time. Discharge Recommendations: defer to PT  Further Equipment Recommendations for Discharge: N/A      Barriers to Learning/Limitations: None      COMPLEXITY     Eval Complexity: History: LOW Complexity : Brief history review ; Examination: LOW Complexity : 1-3 performance deficits relating to physical, cognitive , or psychosocial skils that result in activity limitations and / or participation restrictions ; Decision Making:LOW Complexity : No comorbidities that affect functional and no verbal or physical assistance needed to complete eval tasks  Assessment: LOW Complexity        G-CODES:     Self Care  Current  CI= 1-19%   Goal  CI= 1-19%   D/C  CI= 1-19%. The severity rating is based on the Level of Assistance required for Functional Mobility and ADLs. SUBJECTIVE:   Patient stated I think I am doing pretty good.     OBJECTIVE DATA SUMMARY:     Past Medical History:   Diagnosis Date    ADHD     Anemia     Arthritis     Hammer toe of left foot     Hernia     Hypokalemia     Migraines     Psychiatric disorder     denies anxiety 11/1/17    Rheumatoid arthritis ([facility] [address]     Varicose veins      Past Surgical History:   Procedure Laterality Date    HX ARTHROTOMY Right 04/2016    knee    HX CERVICAL FUSION  03/21/2011    anterior C 3-5 decompression and fusion with cadaver bone and plate    HX CERVICAL FUSION 2012    Anterior C 5-6     HX CERVICAL LAMINECTOMY  08/10/2015    posterior C 3-7 bilateral foraminotiomies and C 2-3 partial laminectomies    HX  SECTION  1995    HX OTHER SURGICAL  2013    sinus surgery    HX OTHER SURGICAL Left 2016    foot    HX OTHER SURGICAL Right 2010    knee surgery    VASCULAR SURGERY PROCEDURE UNLIST       Prior Level of Function/Home Situation: she reports she was independent with her self care prior to this hospitalization; it was difficult secondary to her rheumatoid arthritis  Home Situation  # Steps to Enter: 1  One/Two Story Residence: Two story  # of Interior Steps: 15  Interior Rails: Both  Living Alone: No  Support Systems: Child(aye), Friends \ neighbors, Family member(s)  Patient Expects to be Discharged to[de-identified] Private residence  Current DME Used/Available at Home: None  [x]     Right hand dominant   []     Left hand dominant  Cognitive/Behavioral Status:   she is alert, oriented X 4   Skin: no skin integrity issues noted during OT evaluation; surgical site not observed (dressing is in place)  Edema: no extremity edema is noted  Vision/Perceptual:     tracking is WFL     Coordination:   BUE's WFL   Balance:   modified independent standing for LB clothes management tasks  Strength:  BUE's: 4/5   Tone & Sensation:  BUE's WFL   Range of Motion:  BUE's AROM is WFL; arthritic changes noted throughout BUE's (she reports this is her baseline and she does have difficulty holding on to a walker although she manages the best she can)  Functional Mobility and Transfers for ADLs:  Bed Mobility:   supine to sit and return to supine is modified independent   Transfers:   SPT bed to bedside commode and return is modified independent   ADL Assessment:   self feeding: independent (simulated)  Grooming: independent (simulated at sitting level)  UB bathing/dressing: modified independent (simulated)  LB bathing/dressing: modified independent (additional time and effort)  Pain:  Pt reports 0/10 pain or discomfort prior to treatment.    Pt reports 0/10 pain or discomfort post treatment. Activity Tolerance:   No SOB and no c/o pain noted  Please refer to the flowsheet for vital signs taken during this treatment. After treatment:   []  Patient left in no apparent distress sitting up in chair  [x]  Patient left in no apparent distress in bed  [x]  Call bell left within reach  [x]  Nursing notified  []  Caregiver present  []  Bed alarm activated    COMMUNICATION/EDUCATION:   Communication/Collaboration:  []      Home safety education was provided and the patient/caregiver indicated understanding. [x]      Patient/family have participated as able and agree with findings and recommendations. []      Patient is unable to participate in plan of care at this time.     Taryn Traore OTR/L  Time Calculation: 14 mins

## 2017-11-11 NOTE — PROGRESS NOTES
[facility]  Pt doing well today, no complaints  AFVSS  RLE:  Dressing c/d/i  SILT  EHL/FHL/DF/PF intact  WWP    A s/p R TKA  P  1.  OOB  2. PT/OT  3. Pain control  4. Xarelto  5. Discharge planning    Peyman Calvillo MD

## 2017-11-11 NOTE — PROGRESS NOTES
Problem: Mobility Impaired (Adult and Pediatric)  Goal: *Acute Goals and Plan of Care (Insert Text)  Physical Therapy Goals  Initiated 11/10/2017 and to be accomplished within 7 day(s)  1. Patient will move from supine to sit and sit to supine  in bed with independence. 2.  Patient will transfer from bed to chair and chair to bed with modified independence using the least restrictive device. 3.  Patient will perform sit to stand with modified independence. 4.  Patient will ambulate with modified independence for 300 feet with the least restrictive device. 5.  Patient will ascend/descend 4 stairs with 1 handrail(s) with supervision/set-up. Outcome: Progressing Towards Goal  physical Therapy TREATMENT    Patient: Bekah Delaney (69 y.o. female)  Date: 11/11/2017  Diagnosis: Osteoarthritis of right knee, unspecified osteoarthritis type [M17.11] <principal problem not specified>  Procedure(s) (LRB):  RIGHT TOTAL KNEE ARTHROPLASTY (Right) 1 Day Post-Op  Precautions: Fall, WBAT, KI when OOB  Chart, physical therapy assessment, plan of care and goals were reviewed. ASSESSMENT:  Patient continues to demonstrate decreased quad function on R LE secondary to ongoing effects of femoral nerve shot. Patient continued to demonstrate extensor lag with SLR on R LE. Patient was able to demonstrate self-assist method of maneuvering R LE on/off bed, occ manual assist from PT.  MD Anton Ruffin is aware of ongoing effects of femoral nerve shot, has ordered KI for OOB activity, brace to arrive for pm session today. Educated and instructed patient with bed therex, with emphasis on R knee extension per MD Anton Ruffin. Patient was able to increase force of quad contraction on R LE with repetitions, however, patient demonstrates limited R knee flexion (~60 degrees) due to edema and pain.   Patient required increased verbal/tactile/manual cues for gt sequencing with RW, manual support provided occ at R knee due to buckling, vc/tc for TKE for safety. Patient demonstrated limited increase with ambulation distance with RW. Patient was assisted to chair at bedside, continued instruction with [facility] for home/community safety as well as IS use. Patient with good return demonstration. Instructed patient to call for assist when ready to return back to bed. Patient verbalized comprehension. Will continue to progress as tolerated. Progression toward goals:  []      Improving appropriately and progressing toward goals  [x]      Improving slowly and progressing toward goals  []      Not making progress toward goals and plan of care will be adjusted     PLAN:  Patient continues to benefit from skilled intervention to address the above impairments. Continue treatment per established plan of care. Discharge Recommendations:  Home Health  Further Equipment Recommendations for Discharge:  rolling walker     SUBJECTIVE:   Patient stated I have a lot of pain.     OBJECTIVE DATA SUMMARY:   Critical Behavior:  Neurologic State: Alert, Appropriate for age  Orientation Level: Appropriate for age, Oriented X4  Cognition: Appropriate decision making, Appropriate for age attention/concentration, Appropriate safety awareness, Follows commands  Safety/Judgement: Awareness of environment, Fall prevention  Functional Mobility Training:  Bed Mobility:  Rolling: Contact guard assistance  Supine to Sit: Minimum assistance; Additional time (manual assist for management of R LE on/off bed)  Scooting: Contact guard assistance; Additional time   Interventions: Verbal cues  Transfers:  Sit to Stand: Contact guard assistance; Additional time (with RW)  Stand to Sit: Contact guard assistance; Additional time (with RW)  Balance:  Sitting: Intact  Standing: Impaired;Pull to stand; With support (with RW)  Standing - Static: Good;Constant support (with RW)  Standing - Dynamic : Fair (with RW)  Ambulation/Gait Training:  Distance (ft): 20 Feet (ft)  Assistive Device: Gait belt;Walker, rolling  Ambulation - Level of Assistance: Minimal assistance; Additional time (with RW, manual support at knee)  Gait Abnormalities: Antalgic;Decreased step clearance; Step to gait (R knee with occ buckling)  Right Side Weight Bearing: As tolerated  Base of Support: Narrowed  Speed/Mary: Pace decreased (<100 feet/min); Slow  Step Length: Left shortened;Right shortened  Interventions: Visual/Demos; Verbal cues; Tactile cues;Manual cues  Stairs:  Stairs - Level of Assistance:  (N/A)  Therapeutic Exercises:       EXERCISE   Sets   Reps   Active Active Assist   Passive Self ROM   Comments   Ankle Pumps 1 10  [x] [] [] []    Quad Sets/Glut Sets 1 10 [x] [] [] []    Heel Slides 1 10 [] [x] [] []    Straight Leg Raises 1 10 [] [x] [] []      Pain:  Pain Scale 1: Numeric (0 - 10)  Pain Intensity 1: 5  Pain Location 1: Knee  Pain Orientation 1: Right  Pain Description 1: Aching  Pain Intervention(s) 1: Cold pack; Rest;Repositioned  Activity Tolerance:   Good  Please refer to the flowsheet for vital signs taken during this treatment.   After treatment:   [x] Patient left in no apparent distress sitting up in chair  [] Patient left in no apparent distress in bed  [x] Call bell left within reach  [x] Nursing notified  [] Caregiver present  [] Bed alarm activated      Joey Gotti PT   Time Calculation: 38 mins

## 2017-11-11 NOTE — PROGRESS NOTES
Bedside shift change report given to Jessica Runner, RN (oncoming nurse) by Atul Ramirez RN (offgoing nurse). Report included the following information SBAR, Kardex, Intake/Output, MAR, Recent Results and Med Rec Status.

## 2017-11-11 NOTE — PROGRESS NOTES
Problem: Mobility Impaired (Adult and Pediatric)  Goal: *Acute Goals and Plan of Care (Insert Text)  Physical Therapy Goals  Initiated 11/10/2017 and to be accomplished within 7 day(s)  1. Patient will move from supine to sit and sit to supine  in bed with independence. 2.  Patient will transfer from bed to chair and chair to bed with modified independence using the least restrictive device. 3.  Patient will perform sit to stand with modified independence. 4.  Patient will ambulate with modified independence for 300 feet with the least restrictive device. 5.  Patient will ascend/descend 4 stairs with 1 handrail(s) with supervision/set-up. Outcome: Progressing Towards Goal  physical Therapy TREATMENT    Patient: Vasquez Duran (26 y.o. female)  Date: 11/11/2017  Diagnosis: Osteoarthritis of right knee, unspecified osteoarthritis type [M17.11] <principal problem not specified>  Procedure(s) (LRB):  RIGHT TOTAL KNEE ARTHROPLASTY (Right) 1 Day Post-Op  Precautions: Fall, WBAT R LE, KI when OOB  Chart, physical therapy assessment, plan of care and goals were reviewed. ASSESSMENT:  Patient received sitting in chair at bedside. Donned knee immobilizer on R LE. Reviewed gt sequencing with patient. Patient requiring less overall for mobility, ambulation distance increased significantly with addition of KI on R LE with use of RW, no buckling noted. Mild c/o increased fatigue with increased ambulation task. Extended time required for ambulation secondary to patient does require occ vc's for correct gt sequencing and to maintain CoG over Leo within RW. Patient returned to room, requested to use BR to void. Patient was then able to ambulate back to bed with RW/KI, doffed KI. Completed bed therex. Patient demonstrated more forceful R quad contraction this afternoon, able to complete SLR with partial extensor lag. R knee flexion increased to 76 degrees.   Patient remained in bed at conclusion of session, donned polar care to R knee, positioned for comfort. Will continue to progress patient as tolerated. Progression toward goals:  [x]      Improving appropriately and progressing toward goals  []      Improving slowly and progressing toward goals  []      Not making progress toward goals and plan of care will be adjusted     PLAN:  Patient continues to benefit from skilled intervention to address the above impairments. Continue treatment per established plan of care. Discharge Recommendations:  Home Health  Further Equipment Recommendations for Discharge:  rolling walker     SUBJECTIVE:   Patient stated I think the brace has helped a lot this afternoon.     OBJECTIVE DATA SUMMARY:   Critical Behavior:  Neurologic State: Alert, Appropriate for age  Orientation Level: Appropriate for age, Oriented X4  Cognition: Appropriate decision making, Appropriate for age attention/concentration, Appropriate safety awareness, Follows commands  Safety/Judgement: Awareness of environment, Fall prevention, Good awareness of safety precautions  Functional Mobility Training:  Bed Mobility:  Rolling: Supervision  Supine to Sit: Minimum assistance; Additional time (manual assist for management of R LE on/off bed)  Sit to Supine: Stand-by asssistance  Scooting: Stand-by asssistance   Interventions: Verbal cues  Transfers:  Sit to Stand: Stand-by asssistance; Additional time (with RW/KI)  Stand to Sit: Stand-by asssistance (with RW/KI)  Balance:  Sitting: Intact  Standing: Intact; With support (with RW/KI)  Standing - Static: Good;Constant support (with RW/KI)  Standing - Dynamic : Good (with RW/KI)  Ambulation/Gait Training:  Distance (ft): 350 Feet (ft)  Assistive Device: Brace/Splint;Gait belt;Walker, rolling  Ambulation - Level of Assistance: Stand-by asssistance  Gait Abnormalities: Antalgic; Step to gait  Right Side Weight Bearing: As tolerated  Base of Support: Narrowed  Speed/Mary: Pace decreased (<100 feet/min); Slow  Step Length: Left shortened;Right shortened  Interventions: Verbal cues  Stairs:   Stairs - Level of Assistance:  (N/A)  Therapeutic Exercises:       EXERCISE   Sets   Reps   Active Active Assist   Passive Self ROM   Comments   Ankle Pumps 1 10  [x] [] [] []    Quad Sets/Glut Sets 1 10 [x] [] [] []    Heel Slides 1 10 [x] [] [] []    Straight Leg Raises 1 10 [] [x] [] []    Long Arc Quads 1 10 [] [x] [] []      Pain:  Pain Scale 1: Numeric (0 - 10)  Pain Intensity 1: 5  Pain Location 1: Knee  Pain Orientation 1: Right  Pain Description 1: Aching  Pain Intervention(s) 1: Cold pack; Rest;Repositioned  Activity Tolerance:   Good  Please refer to the flowsheet for vital signs taken during this treatment.   After treatment:   [] Patient left in no apparent distress sitting up in chair  [x] Patient left in no apparent distress in bed  [x] Call bell left within reach  [] Nursing notified  [] Caregiver present  [] Bed alarm activated      Krystina Cotton PT   Time Calculation: 41 mins

## 2017-11-11 NOTE — PROGRESS NOTES
Problem: Falls - Risk of  Goal: *Absence of Falls  Document Judy Fall Risk and appropriate interventions in the flowsheet.    Outcome: Progressing Towards Goal  Fall Risk Interventions:  Mobility Interventions: Patient to call before getting OOB         Medication Interventions: Patient to call before getting OOB         History of Falls Interventions: Door open when patient unattended, Investigate reason for fall

## 2017-11-11 NOTE — PROGRESS NOTES
Bedside and Verbal shift change report given to Jairo Gregory RN (oncoming nurse) by Saud Phillips RN (offgoing nurse). Report included the following information SBAR, Kardex, MAR and Recent Results.     SITUATION:    Code Status: Prior   Reason for Admission: Osteoarthritis of right knee, unspecified osteoarthritis type Saturnino Dejuan 1560 day: 1   Problem List:       Hospital Problems  Date Reviewed: 11/10/2017          Codes Class Noted POA    Knee osteoarthritis ICD-10-CM: M17.10  ICD-9-CM: 715.36  11/10/2017 Unknown              BACKGROUND:    Past Medical History:   Past Medical History:   Diagnosis Date    ADHD     Anemia     Arthritis     Hammer toe of left foot     Hernia     Hypokalemia     Migraines     Psychiatric disorder     denies anxiety 17    Rheumatoid arthritis ([facility] [address]     Varicose veins          Patient taking anticoagulants yes     ASSESSMENT:    Changes in Assessment Throughout Shift: none     Patient has Central Line:  no Reasons if yes:      Patient has Herbert Cath: no Reasons if yes:          Last Vitals:     Vitals:    11/10/17 1716 11/10/17 2000 17 0000 17 0400   BP: 106/66 118/81 113/73 107/70   Pulse: 65 78 69 78   Resp: 16 16 16 16   Temp: 96.6 °F (35.9 °C) 97.3 °F (36.3 °C) 99.1 °F (37.3 °C) 98.2 °F (36.8 °C)   SpO2: 98% 98% 100% 98%   Weight:       Height:            IV and DRAINS (will only show if present)   Peripheral IV 11/10/17 Left Hand-Site Assessment: Clean, dry, & intact  Hemovac Right Knee-Site Assessment: Clean, dry, & intact     WOUND (if present)   Wound Type:  surgical   Dressing present Dressing Present : Yes (pt removed herself)   Wound Concerns/Notes:  none     PAIN    Pain Assessment    Pain Intensity 1: 3 (17 0625)    Pain Location 1: Knee    Pain Intervention(s) 1: Ice    Patient Stated Pain Goal: 0  o Interventions for Pain:  medication  o Intervention effective: yes  o Time of last intervention: see mar   o Reassessment Completed: yes      Last 3 Weights:  Last 3 Recorded Weights in this Encounter    11/01/17 0804 11/10/17 0648   Weight: 49.9 kg (110 lb) 51.9 kg (114 lb 6.4 oz)     Weight change:      INTAKE/OUPUT    Current Shift:      Last three shifts: 11/09 1901 - 11/11 0700  In: 650 [P.O.:600; I.V.:50]  Out: 1820 [Urine:1550; Drains:220]     LAB RESULTS     Recent Labs      11/11/17   0041   HGB  10.2*   HCT  32.0*        Recent Labs      11/10/17   1320   NA  141   K  4.4   GLU  89   BUN  17   CREA  0.59*   CA  8.3*       RECOMMENDATIONS AND DISCHARGE PLANNING     1. Pending tests/procedures/ Plan of Care or Other Needs: pain management, PT/OT     2. Discharge plan for patient and Needs/Barriers:     3. Estimated Discharge Date: 11/12/17 Posted on Whiteboard in [address] [place] [id number] Room: yes      4. The patient's care plan was reviewed with the oncoming nurse. \"HEALS\" SAFETY CHECK      Fall Risk    Total Score: 3    Safety Measures: Safety Measures: Bed/Chair-Wheels locked, Bed in low position, Call light within reach, Fall prevention (comment), Gripper socks    A safety check occurred in the patient's room between off going nurse and oncoming nurse listed above. The safety check included the below items  Area Items   H  High Alert Medications - Verify all high alert medication drips (heparin, PCA, etc.)   E  Equipment - Suction is set up for ALL patients (with yanker)  - Red plugs utilized for all equipment (IV pumps, etc.)  - WOWs wiped down at end of shift.  - Room stocked with oxygen, suction, and other unit-specific supplies   A  Alarms - Bed alarm is set for fall risk patients  - Ensure chair alarm is in place and activated if patient is up in a chair   L  Lines - Check IV for any infiltration  - Herbert bag is empty if patient has a Herbert   - Tubing and IV bags are labeled   S  Safety   - Room is clean, patient is clean, and equipment is clean. - Hallways are clear from equipment besides carts.    - Fall bracelet on for fall risk patients  - Ensure room is clear and free of clutter  - Suction is set up for ALL patients (with kianaker)  - Hallways are clear from equipment besides carts.    - Isolation precautions followed, supplies available outside room, sign posted     Jessi Aguilar RN

## 2017-11-11 NOTE — PROGRESS NOTES
2252  General: lying in bed in supine, not apparent distress  Neuro: AOx4, denies numbness, 0 tingling, able to wiggle toes to bilateral lower extremities  Cardio: pedal pulses palpable  Respiratory: denies shortness of breath  Skin: Dressing to right knee clean, dry and intact, polar ice in place  GI: voiding  : denies nausea and vomiting  Mus: ambulates c assistance  Bed in low position and call bell within reach.        0430  Neuro: AOx4, denies numbness, 0 tingling, able to wiggle toes to bilateral lower extremities  Skin: Dressing to right knee clean, dry and intact, polar ice in place  In stable condition

## 2017-11-12 ENCOUNTER — HOME HEALTH ADMISSION (OUTPATIENT)
Dept: HOME HEALTH SERVICES | Facility: HOME HEALTH | Age: 52
End: 2017-11-12

## 2017-11-12 ENCOUNTER — HOME HEALTH ADMISSION (OUTPATIENT)
Dept: HOME HEALTH SERVICES | Facility: HOME HEALTH | Age: 52
End: 2017-11-12
Payer: OTHER GOVERNMENT

## 2017-11-12 VITALS
OXYGEN SATURATION: 96 % | DIASTOLIC BLOOD PRESSURE: 72 MMHG | WEIGHT: 114.4 LBS | BODY MASS INDEX: 20.27 KG/M2 | HEIGHT: 63 IN | HEART RATE: 97 BPM | SYSTOLIC BLOOD PRESSURE: 103 MMHG | TEMPERATURE: 98.5 F | RESPIRATION RATE: 18 BRPM

## 2017-11-12 LAB
HCT VFR BLD AUTO: 31.2 % (ref 35–45)
HGB BLD-MCNC: 10 G/DL (ref 12–16)

## 2017-11-12 PROCEDURE — 74011250637 HC RX REV CODE- 250/637: Performed by: ORTHOPAEDIC SURGERY

## 2017-11-12 PROCEDURE — 97110 THERAPEUTIC EXERCISES: CPT

## 2017-11-12 PROCEDURE — 36415 COLL VENOUS BLD VENIPUNCTURE: CPT | Performed by: ORTHOPAEDIC SURGERY

## 2017-11-12 PROCEDURE — 85018 HEMOGLOBIN: CPT | Performed by: ORTHOPAEDIC SURGERY

## 2017-11-12 PROCEDURE — 97116 GAIT TRAINING THERAPY: CPT

## 2017-11-12 PROCEDURE — 97530 THERAPEUTIC ACTIVITIES: CPT

## 2017-11-12 RX ADMIN — OXYCODONE HYDROCHLORIDE 10 MG: 5 TABLET ORAL at 08:07

## 2017-11-12 RX ADMIN — OXYCODONE HYDROCHLORIDE 15 MG: 5 TABLET ORAL at 04:32

## 2017-11-12 RX ADMIN — CELECOXIB 200 MG: 100 CAPSULE ORAL at 08:07

## 2017-11-12 RX ADMIN — VENLAFAXINE HYDROCHLORIDE 75 MG: 37.5 TABLET ORAL at 08:08

## 2017-11-12 RX ADMIN — RIVAROXABAN 10 MG: 10 TABLET, FILM COATED ORAL at 13:39

## 2017-11-12 RX ADMIN — OXYCODONE HYDROCHLORIDE 10 MG: 5 TABLET ORAL at 13:39

## 2017-11-12 RX ADMIN — DEXTROAMPHETAMINE SACCHARATE, AMPHETAMINE ASPARTATE MONOHYDRATE, DEXTROAMPHETAMINE SULFATE, AND AMPHETAMINE SULFATE 60 MG: 2.5; 2.5; 2.5; 2.5 CAPSULE ORAL at 06:06

## 2017-11-12 RX ADMIN — Medication 10 ML: at 06:00

## 2017-11-12 RX ADMIN — ACETAMINOPHEN 1000 MG: 500 TABLET, COATED ORAL at 06:00

## 2017-11-12 NOTE — DISCHARGE INSTRUCTIONS
Discharge Instructions for Total Knee Replacement Patients    · The dressing on your knee will be changed by the Home Health professional at the appropriate time. Keep your incision clean and dry. Do not apply any ointments to the incision. · You may shower as long as you keep you incision dry. When showering, leave your dressing on. The dressing is waterproof as long as the edges are sealed. · Notify your surgeon if:  · Your temperature is greater than 100.5  · You have pain not controlled by your pain medication  · You have increased drainage from your incision  · You have increased redness or swelling in your leg  · You have chest pain, shortness of breath, or any other problems    · Do your exercises as instructed by the home physical therapist.    · Bend and straighten your operative leg every hour. Walk once an hour during normal walking hours. · During periods of inactivity or rest, your leg should be elevated with a rolled towel or folded pillow under the heel to keep the knee straight. · Do not place anything under the knee. · Do not sit in a recliner chair with the footrest elevated. · You may use ice to your knee for 23-30 minutes after exercise and as needed. Do not apply the ice pack directly to your skin. Use a barrier such as your pant leg or a thin towel. · If you have PREICOUS hose (the white support stockings), remove them at bedtime and re-apply the hose in the morning for the next 2 weeks. Best of luck with your new knee and [address] YOU for choosing the [address]! [employer] Activation    Thank you for requesting access to [employer]. Please follow the instructions below to securely access and download your online medical record. [employer] allows you to send messages to your doctor, view your test results, renew your prescriptions, schedule appointments, and more. How Do I Sign Up? 1. In your internet browser, go to [email or web address]  2.  Click on the First Time User? Click Here link in the Sign In box. You will be redirect to the New Member Sign Up page. 3. Enter your [employer] Access Code exactly as it appears below. You will not need to use this code after youve completed the sign-up process. If you do not sign up before the expiration date, you must request a new code. MyChart Access Code: Activation code not generated  Current Shoutfit Status: Patient Declined (This is the date your MyChart access code will )    4. Enter the last four digits of your Social Security Number (xxxx) and Date of Birth (mm/dd/yyyy) as indicated and click Submit. You will be taken to the next sign-up page. 5. Create a [employer] ID. This will be your Shoutfit login ID and cannot be changed, so think of one that is secure and easy to remember. 6. Create a Shoutfit password. You can change your password at any time. 7. Enter your Password Reset Question and Answer. This can be used at a later time if you forget your password. 8. Enter your e-mail address. You will receive e-mail notification when new information is available in [address] 9. Click Sign Up. You can now view and download portions of your medical record. 10. Click the Download Summary menu link to download a portable copy of your medical information. Additional Information    If you have questions, please visit the Frequently Asked Questions section of the [employer] website at [email or web address] Remember, [employer] is NOT to be used for urgent needs. For medical emergencies, dial 911.

## 2017-11-12 NOTE — PROGRESS NOTES
Problem: Mobility Impaired (Adult and Pediatric)  Goal: *Acute Goals and Plan of Care (Insert Text)  Physical Therapy Goals  Initiated 11/10/2017 and to be accomplished within 7 day(s)  1. Patient will move from supine to sit and sit to supine  in bed with independence. 2.  Patient will transfer from bed to chair and chair to bed with modified independence using the least restrictive device. 3.  Patient will perform sit to stand with modified independence. 4.  Patient will ambulate with modified independence for 300 feet with the least restrictive device. 5.  Patient will ascend/descend 4 stairs with 1 handrail(s) with supervision/set-up. Outcome: Progressing Towards Goal  physical Therapy TREATMENT    Patient: Drew Edward (45 y.o. female)  Date: 11/12/2017  Diagnosis: Osteoarthritis of right knee, unspecified osteoarthritis type [M17.11] <principal problem not specified>  Procedure(s) (LRB):  RIGHT TOTAL KNEE ARTHROPLASTY (Right) 2 Days Post-Op  Precautions:  fall, knee immobilizer on R  Chart, physical therapy assessment, plan of care and goals were reviewed. ASSESSMENT:  Pt demonstrated increased independence with functional transfers. Pt continues to require knee immobilizer on R due to significant knee lag on R with SLR. Pt is able to perform LAQ through approximately 50% of available range when sitting edge of bed. Pt performed standing transfers at mod I level with knee immobilizer donned. With verbal cues patient initiated step through gait pattern and ambulated 300 feet with RW and supervision. Pt has 90 degrees of R knee flexion in sitting. Progression toward goals:  [x]      Improving appropriately and progressing toward goals  []      Improving slowly and progressing toward goals  []      Not making progress toward goals and plan of care will be adjusted     PLAN:  Patient continues to benefit from skilled intervention to address the above impairments.   Continue treatment per established plan of care. Discharge Recommendations:  Home Health  Further Equipment Recommendations for Discharge:  rolling walker     G-CODES:     Mobility  Current  CI= 1-19%   Goal  CI= 1-19%. The severity rating is based on the Level of Assistance required for Functional Mobility and ADLs. SUBJECTIVE:   Patient stated Ray Atkinsfabiens can tell my knee is waking up a little but still isn't all the way there.     OBJECTIVE DATA SUMMARY:   Critical Behavior:  Neurologic State: Alert, Appropriate for age  Orientation Level: Appropriate for age  Cognition: Appropriate decision making  Safety/Judgement: Awareness of environment, Fall prevention, Good awareness of safety precautions  Functional Mobility Training:  Bed Mobility:  Rolling: Modified independent  Supine to Sit: Modified independent  Sit to Supine: Modified independent  Transfers:  Sit to Stand: Modified independent  Stand to Sit: Modified independent  Bed to Chair: Supervision     Balance:  Sitting: Intact  Standing: Intact; With support  Ambulation/Gait Training:  Distance (ft): 300 Feet (ft)  Assistive Device: Walker, rolling;Gait belt  Ambulation - Level of Assistance: Supervision  Gait Abnormalities: Decreased step clearance  Step Length: Left shortened  Therapeutic Exercises:   Seated and supine LE exercises x10  Pain:  Pt reports 3/10 pain or discomfort prior to treatment.    Pt reports 4/10 pain or discomfort post treatment. Activity Tolerance:   good  Please refer to the flowsheet for vital signs taken during this treatment.   After treatment:   [] Patient left in no apparent distress sitting up in chair  [x] Patient left in no apparent distress in bed  [x] Call bell left within reach  [x] Nursing notified  [] Caregiver present  [] Bed alarm activated    Educate patient on performing LE exercises throughout the day especially quad sets, LAQ and SLR with assistance and continuing to wear knee immobilizer when up    Bravo Dowell, PT   Time Calculation: 39 mins

## 2017-11-12 NOTE — ROUTINE PROCESS
Bedside and Verbal shift change report given to Polina Chaudhry RN (oncoming nurse) by Kvng Ivory RN (offgoing nurse). Report included the following information SBAR, Kardex, ED Summary and Recent Results.

## 2017-11-12 NOTE — PROGRESS NOTES
Care Management Interventions  PCP Verified by CM: Yes  Mode of Transport at Discharge:  (family)  Transition of Care Consult (CM Consult): [address]: Yes  Discharge Durable Medical Equipment: Yes  Physical Therapy Consult: Yes  Occupational Therapy Consult: Yes  Current Support Network: Lives with Spouse, Family Lives Nearby  Confirm Follow Up Transport: Family  Plan discussed with Pt/Family/Caregiver: Yes  Freedom of Choice Offered: Yes (home health)  Discharge Location  Discharge Placement: Home with home health    Pt is a 45year old admitted for osteoarthritis of right knee. Pt is alert and oriented and alone in room. Pt reports that she lives with her son, and her  Jostin Sahni [phone]. Pt reports that her daughter lives about 1 mile away and that her [facility] is in town to stay with her when she comes home. Pt reports that prior to admission she was independent in her ADLs and that she needs a RW. Pt reports that she has a old front wheeled walker from a [facility]  [employer] [facility] but that she sleeps upstairs and plans to have a walker upstairs and a walker downstairs. Pt plans to return home on discharge and has transportation home with family. FOC signed for [facility]. Referral sent. Spoke to [facility] with [facility]. Order for walker faxed to [employer]. Called [employer] and spoke to Ray Mcfarland who states she will page the  Jagdeep Carrillo [phone]. [phone] from [employer] called asking that we give pt a walker from [employer]. \" Informed Yovanidaron Pérez that there is no \"closet. \" Hollie Pérez states that he will review order and have walker delivered today. 1545 - Per nursing, pt has received her walker from [employer].

## 2017-11-12 NOTE — DISCHARGE SUMMARY
Total Joint Discharge Summary      Patient ID:  Doris Keller  [id number]  45 y.o.  1965    Admit date: 11/10/2017    Discharge date and time: 11/12/2017    Admitting Physician: Misty Prajapati MD     Discharge Physician: Dorothy Leo    Admission Diagnoses: Osteoarthritis of right knee, unspecified osteoarthritis type [M17.11]    Discharge Diagnoses: Active Problems:    Knee osteoarthritis (11/10/2017)    Procedures: Right total knee arthroplasty    Surgeon: Dorothy Leo    Preoperative Medical Clearance: Dr. Keri Kirkland                          Perioperative Antibiotics: Ancef     Postoperative Pain Management:  Exparel, celebrex, oxycodone, tylenol    DVT Prophylaxis:  Xarelto x 21 days    Postoperative transfusions:     none Banked PRBCs     Post Op complications: none    Hemoglobin at discharge: _10.0__    Discharge Exam    Temp: 98.9 °F (37.2 °C) (11/12/17 1119) Pulse (Heart Rate): 86 (11/12/17 1119) Resp Rate: 18 (11/12/17 1119) BP: 95/63 (11/12/17 1119) O2 Sat (%): 97 % (11/12/17 1119) Weight: 51.9 kg (114 lb 6.4 oz) (11/10/17 0648)       Wound appears to be healing without any evidence of infection. Intact Wuadriceps, EHL, FHL, GS, TA motor function. L3-S1 sensation intact  DP pulse palpable    Physical Therapy started on the day following surgery and progressed to independent ambulation with the aid of a walker. At the time of discharge, able to go up and down stairs and had understanding of precautions needed following surgery.       PT at time of DC: _90__degrees    Discharged to: Home with home health    Discharge instructions:  -Anticoagulate with: xarelto x 21 days  -Resume pre hospital diet            -Resume home medications per medical continuation form     -Ambulate with walker, appropriate total joint protocol  -Follow up in office as scheduled       Signed:  Misty Prajapati MD  11/12/2017  11:59 AM

## 2017-11-12 NOTE — PROGRESS NOTES
Problem: Mobility Impaired (Adult and Pediatric)  Goal: *Acute Goals and Plan of Care (Insert Text)  Physical Therapy Goals  Initiated 11/10/2017 and to be accomplished within 7 day(s)  1. Patient will move from supine to sit and sit to supine  in bed with independence. 2.  Patient will transfer from bed to chair and chair to bed with modified independence using the least restrictive device. 3.  Patient will perform sit to stand with modified independence. 4.  Patient will ambulate with modified independence for 300 feet with the least restrictive device. 5.  Patient will ascend/descend 4 stairs with 1 handrail(s) with supervision/set-up. Outcome: Progressing Towards Goal  physical Therapy TREATMENT    Patient: Julio Byrd (45 y.o. female)  Date: 11/12/2017  Diagnosis: Osteoarthritis of right knee, unspecified osteoarthritis type [M17.11] <principal problem not specified>  Procedure(s) (LRB):  RIGHT TOTAL KNEE ARTHROPLASTY (Right) 2 Days Post-Op  Precautions:    Chart, physical therapy assessment, plan of care and goals were reviewed. ASSESSMENT:  Pt demonstrated increased independence with functional mobility and ambulated in her room at mod I level with RW. Pt was educated on ascending/descending steps with one rail and was able to ascend/descend 4 steps with 1 rail and supervision. Progression toward goals:  [x]      Improving appropriately and progressing toward goals  []      Improving slowly and progressing toward goals  []      Not making progress toward goals and plan of care will be adjusted     PLAN:  Patient continues to benefit from skilled intervention to address the above impairments. Continue treatment per established plan of care. Discharge Recommendations:  Home Health  Further Equipment Recommendations for Discharge:  rolling walker     G-CODES:     Mobility  Current  CI= 1-19%   Goal  CI= 1-19%.   The severity rating is based on the Level of Assistance required for Functional Mobility and ADLs. SUBJECTIVE:   Patient stated My knee is bothering me a little more this afternoon.     OBJECTIVE DATA SUMMARY:   Critical Behavior:  Neurologic State: Alert  Orientation Level: Oriented X4  Cognition: Follows commands  Safety/Judgement: Awareness of environment, Fall prevention, Good awareness of safety precautions  Functional Mobility Training:  Bed Mobility:  Rolling: Modified independent  Supine to Sit: Modified independent  Sit to Supine: Modified independent  Transfers:  Sit to Stand: Modified independent  Stand to Sit: Modified independent  Bed to Chair: Supervision  Balance:  Sitting: Intact  Standing: With support  Standing - Static: Good  Standing - Dynamic : Fair  Ambulation/Gait Training:  Distance (ft): 15 Feet (ft) (x2)  Assistive Device: Walker, rolling  Ambulation - Level of Assistance: Modified independent  Gait Abnormalities: Decreased step clearance  Step Length: Left shortened  Stairs:  Number of Stairs Trained: 4  Stairs - Level of Assistance: Supervision  Rail Use: Left     Pain:  Pt reports 4/10 pain or discomfort prior to treatment.    Pt reports 4/10 pain or discomfort post treatment. Activity Tolerance:   fair  Please refer to the flowsheet for vital signs taken during this treatment.   After treatment:   [] Patient left in no apparent distress sitting up in chair  [x] Patient left in no apparent distress in bed  [x] Call bell left within reach  [x] Nursing notified  [] Caregiver present  [] Bed alarm activated    Educated patient on ascending/descending stairs    Maria Luz Mcknight PT   Time Calculation: 15 mins

## 2017-11-13 ENCOUNTER — HOME CARE VISIT (OUTPATIENT)
Dept: SCHEDULING | Facility: HOME HEALTH | Age: 52
End: 2017-11-13
Payer: OTHER GOVERNMENT

## 2017-11-13 VITALS
HEART RATE: 91 BPM | SYSTOLIC BLOOD PRESSURE: 92 MMHG | TEMPERATURE: 98.2 F | OXYGEN SATURATION: 95 % | DIASTOLIC BLOOD PRESSURE: 60 MMHG

## 2017-11-13 PROCEDURE — 3331090001 HH PPS REVENUE CREDIT

## 2017-11-13 PROCEDURE — 3331090002 HH PPS REVENUE DEBIT

## 2017-11-13 PROCEDURE — G0299 HHS/HOSPICE OF RN EA 15 MIN: HCPCS

## 2017-11-13 PROCEDURE — G0151 HHCP-SERV OF PT,EA 15 MIN: HCPCS

## 2017-11-13 PROCEDURE — 400013 HH SOC

## 2017-11-14 ENCOUNTER — HOME CARE VISIT (OUTPATIENT)
Dept: SCHEDULING | Facility: HOME HEALTH | Age: 52
End: 2017-11-14
Payer: OTHER GOVERNMENT

## 2017-11-14 ENCOUNTER — HOME CARE VISIT (OUTPATIENT)
Dept: HOME HEALTH SERVICES | Facility: HOME HEALTH | Age: 52
End: 2017-11-14
Payer: OTHER GOVERNMENT

## 2017-11-14 PROCEDURE — 3331090002 HH PPS REVENUE DEBIT

## 2017-11-14 PROCEDURE — A6255 ABSORPT DRG >16<=48 IN W/BDR: HCPCS

## 2017-11-14 PROCEDURE — G0157 HHC PT ASSISTANT EA 15: HCPCS

## 2017-11-14 PROCEDURE — 3331090001 HH PPS REVENUE CREDIT

## 2017-11-15 ENCOUNTER — HOME CARE VISIT (OUTPATIENT)
Dept: SCHEDULING | Facility: HOME HEALTH | Age: 52
End: 2017-11-15
Payer: OTHER GOVERNMENT

## 2017-11-15 VITALS
OXYGEN SATURATION: 97 % | TEMPERATURE: 98.4 F | SYSTOLIC BLOOD PRESSURE: 98 MMHG | DIASTOLIC BLOOD PRESSURE: 70 MMHG | HEART RATE: 101 BPM

## 2017-11-15 PROCEDURE — 3331090001 HH PPS REVENUE CREDIT

## 2017-11-15 PROCEDURE — 3331090002 HH PPS REVENUE DEBIT

## 2017-11-15 PROCEDURE — G0157 HHC PT ASSISTANT EA 15: HCPCS

## 2017-11-16 ENCOUNTER — HOME CARE VISIT (OUTPATIENT)
Dept: SCHEDULING | Facility: HOME HEALTH | Age: 52
End: 2017-11-16
Payer: OTHER GOVERNMENT

## 2017-11-16 VITALS
RESPIRATION RATE: 16 BRPM | SYSTOLIC BLOOD PRESSURE: 92 MMHG | TEMPERATURE: 98.2 F | HEART RATE: 91 BPM | OXYGEN SATURATION: 95 % | DIASTOLIC BLOOD PRESSURE: 60 MMHG

## 2017-11-16 VITALS
OXYGEN SATURATION: 97 % | SYSTOLIC BLOOD PRESSURE: 98 MMHG | DIASTOLIC BLOOD PRESSURE: 68 MMHG | HEART RATE: 92 BPM | TEMPERATURE: 98.6 F

## 2017-11-16 PROCEDURE — 3331090001 HH PPS REVENUE CREDIT

## 2017-11-16 PROCEDURE — 3331090002 HH PPS REVENUE DEBIT

## 2017-11-16 PROCEDURE — G0157 HHC PT ASSISTANT EA 15: HCPCS

## 2017-11-17 ENCOUNTER — HOME CARE VISIT (OUTPATIENT)
Dept: SCHEDULING | Facility: HOME HEALTH | Age: 52
End: 2017-11-17
Payer: OTHER GOVERNMENT

## 2017-11-17 VITALS
TEMPERATURE: 98.6 F | SYSTOLIC BLOOD PRESSURE: 98 MMHG | OXYGEN SATURATION: 96 % | HEART RATE: 96 BPM | DIASTOLIC BLOOD PRESSURE: 67 MMHG

## 2017-11-17 PROCEDURE — 3331090001 HH PPS REVENUE CREDIT

## 2017-11-17 PROCEDURE — G0299 HHS/HOSPICE OF RN EA 15 MIN: HCPCS

## 2017-11-17 PROCEDURE — G0157 HHC PT ASSISTANT EA 15: HCPCS

## 2017-11-17 PROCEDURE — 3331090002 HH PPS REVENUE DEBIT

## 2017-11-18 ENCOUNTER — HOME CARE VISIT (OUTPATIENT)
Dept: SCHEDULING | Facility: HOME HEALTH | Age: 52
End: 2017-11-18
Payer: OTHER GOVERNMENT

## 2017-11-18 PROCEDURE — G0157 HHC PT ASSISTANT EA 15: HCPCS

## 2017-11-18 PROCEDURE — 3331090001 HH PPS REVENUE CREDIT

## 2017-11-18 PROCEDURE — 3331090002 HH PPS REVENUE DEBIT

## 2017-11-19 ENCOUNTER — HOME CARE VISIT (OUTPATIENT)
Dept: SCHEDULING | Facility: HOME HEALTH | Age: 52
End: 2017-11-19
Payer: OTHER GOVERNMENT

## 2017-11-19 PROCEDURE — G0157 HHC PT ASSISTANT EA 15: HCPCS

## 2017-11-19 PROCEDURE — 3331090001 HH PPS REVENUE CREDIT

## 2017-11-19 PROCEDURE — 3331090002 HH PPS REVENUE DEBIT

## 2017-11-20 ENCOUNTER — HOME CARE VISIT (OUTPATIENT)
Dept: SCHEDULING | Facility: HOME HEALTH | Age: 52
End: 2017-11-20
Payer: OTHER GOVERNMENT

## 2017-11-20 VITALS
OXYGEN SATURATION: 97 % | TEMPERATURE: 98.8 F | DIASTOLIC BLOOD PRESSURE: 70 MMHG | TEMPERATURE: 98.6 F | DIASTOLIC BLOOD PRESSURE: 64 MMHG | OXYGEN SATURATION: 97 % | DIASTOLIC BLOOD PRESSURE: 77 MMHG | HEART RATE: 83 BPM | SYSTOLIC BLOOD PRESSURE: 104 MMHG | SYSTOLIC BLOOD PRESSURE: 98 MMHG | HEART RATE: 110 BPM | SYSTOLIC BLOOD PRESSURE: 112 MMHG | OXYGEN SATURATION: 96 % | HEART RATE: 104 BPM

## 2017-11-20 PROCEDURE — 3331090002 HH PPS REVENUE DEBIT

## 2017-11-20 PROCEDURE — G0157 HHC PT ASSISTANT EA 15: HCPCS

## 2017-11-20 PROCEDURE — 3331090001 HH PPS REVENUE CREDIT

## 2017-11-21 ENCOUNTER — HOME CARE VISIT (OUTPATIENT)
Dept: SCHEDULING | Facility: HOME HEALTH | Age: 52
End: 2017-11-21
Payer: OTHER GOVERNMENT

## 2017-11-21 VITALS
SYSTOLIC BLOOD PRESSURE: 114 MMHG | DIASTOLIC BLOOD PRESSURE: 78 MMHG | HEART RATE: 85 BPM | TEMPERATURE: 98.1 F | SYSTOLIC BLOOD PRESSURE: 112 MMHG | TEMPERATURE: 98.5 F | HEART RATE: 89 BPM | DIASTOLIC BLOOD PRESSURE: 74 MMHG | OXYGEN SATURATION: 100 % | OXYGEN SATURATION: 99 %

## 2017-11-21 PROCEDURE — 3331090002 HH PPS REVENUE DEBIT

## 2017-11-21 PROCEDURE — G0157 HHC PT ASSISTANT EA 15: HCPCS

## 2017-11-21 PROCEDURE — 3331090001 HH PPS REVENUE CREDIT

## 2017-11-22 ENCOUNTER — HOME CARE VISIT (OUTPATIENT)
Dept: SCHEDULING | Facility: HOME HEALTH | Age: 52
End: 2017-11-22
Payer: OTHER GOVERNMENT

## 2017-11-22 VITALS
OXYGEN SATURATION: 97 % | SYSTOLIC BLOOD PRESSURE: 120 MMHG | DIASTOLIC BLOOD PRESSURE: 70 MMHG | TEMPERATURE: 98.4 F | HEART RATE: 106 BPM

## 2017-11-22 PROCEDURE — 3331090002 HH PPS REVENUE DEBIT

## 2017-11-22 PROCEDURE — 3331090001 HH PPS REVENUE CREDIT

## 2017-11-22 PROCEDURE — G0157 HHC PT ASSISTANT EA 15: HCPCS

## 2017-11-23 ENCOUNTER — HOME CARE VISIT (OUTPATIENT)
Dept: HOME HEALTH SERVICES | Facility: HOME HEALTH | Age: 52
End: 2017-11-23
Payer: OTHER GOVERNMENT

## 2017-11-23 PROCEDURE — 3331090001 HH PPS REVENUE CREDIT

## 2017-11-23 PROCEDURE — 3331090002 HH PPS REVENUE DEBIT

## 2017-11-23 PROCEDURE — G0157 HHC PT ASSISTANT EA 15: HCPCS

## 2017-11-24 ENCOUNTER — HOME CARE VISIT (OUTPATIENT)
Dept: SCHEDULING | Facility: HOME HEALTH | Age: 52
End: 2017-11-24
Payer: OTHER GOVERNMENT

## 2017-11-24 VITALS
DIASTOLIC BLOOD PRESSURE: 62 MMHG | SYSTOLIC BLOOD PRESSURE: 98 MMHG | HEART RATE: 65 BPM | TEMPERATURE: 98.6 F | OXYGEN SATURATION: 99 %

## 2017-11-24 PROCEDURE — 3331090001 HH PPS REVENUE CREDIT

## 2017-11-24 PROCEDURE — G0157 HHC PT ASSISTANT EA 15: HCPCS

## 2017-11-24 PROCEDURE — 3331090002 HH PPS REVENUE DEBIT

## 2017-11-24 PROCEDURE — G0299 HHS/HOSPICE OF RN EA 15 MIN: HCPCS

## 2017-11-25 ENCOUNTER — HOME CARE VISIT (OUTPATIENT)
Dept: SCHEDULING | Facility: HOME HEALTH | Age: 52
End: 2017-11-25
Payer: OTHER GOVERNMENT

## 2017-11-25 VITALS
OXYGEN SATURATION: 97 % | SYSTOLIC BLOOD PRESSURE: 96 MMHG | TEMPERATURE: 98.3 F | DIASTOLIC BLOOD PRESSURE: 86 MMHG | HEART RATE: 98 BPM | OXYGEN SATURATION: 99 % | HEART RATE: 100 BPM | SYSTOLIC BLOOD PRESSURE: 96 MMHG | TEMPERATURE: 98.9 F | DIASTOLIC BLOOD PRESSURE: 70 MMHG

## 2017-11-25 PROCEDURE — 3331090002 HH PPS REVENUE DEBIT

## 2017-11-25 PROCEDURE — G0157 HHC PT ASSISTANT EA 15: HCPCS

## 2017-11-25 PROCEDURE — 3331090001 HH PPS REVENUE CREDIT

## 2017-11-26 ENCOUNTER — HOME CARE VISIT (OUTPATIENT)
Dept: SCHEDULING | Facility: HOME HEALTH | Age: 52
End: 2017-11-26
Payer: OTHER GOVERNMENT

## 2017-11-26 VITALS
OXYGEN SATURATION: 95 % | HEART RATE: 103 BPM | SYSTOLIC BLOOD PRESSURE: 100 MMHG | TEMPERATURE: 98.1 F | DIASTOLIC BLOOD PRESSURE: 78 MMHG

## 2017-11-26 PROCEDURE — G0157 HHC PT ASSISTANT EA 15: HCPCS

## 2017-11-26 PROCEDURE — 3331090001 HH PPS REVENUE CREDIT

## 2017-11-26 PROCEDURE — 3331090002 HH PPS REVENUE DEBIT

## 2017-11-27 ENCOUNTER — HOME CARE VISIT (OUTPATIENT)
Dept: SCHEDULING | Facility: HOME HEALTH | Age: 52
End: 2017-11-27
Payer: OTHER GOVERNMENT

## 2017-11-27 VITALS
SYSTOLIC BLOOD PRESSURE: 100 MMHG | TEMPERATURE: 99.7 F | HEART RATE: 89 BPM | OXYGEN SATURATION: 98 % | DIASTOLIC BLOOD PRESSURE: 64 MMHG

## 2017-11-27 PROCEDURE — 3331090002 HH PPS REVENUE DEBIT

## 2017-11-27 PROCEDURE — 3331090001 HH PPS REVENUE CREDIT

## 2017-11-27 PROCEDURE — G0151 HHCP-SERV OF PT,EA 15 MIN: HCPCS

## 2017-11-28 ENCOUNTER — HOME CARE VISIT (OUTPATIENT)
Dept: SCHEDULING | Facility: HOME HEALTH | Age: 52
End: 2017-11-28
Payer: OTHER GOVERNMENT

## 2017-11-28 VITALS
SYSTOLIC BLOOD PRESSURE: 120 MMHG | RESPIRATION RATE: 18 BRPM | TEMPERATURE: 99 F | OXYGEN SATURATION: 98 % | DIASTOLIC BLOOD PRESSURE: 80 MMHG | HEART RATE: 78 BPM

## 2017-11-28 PROCEDURE — 3331090001 HH PPS REVENUE CREDIT

## 2017-11-28 PROCEDURE — 3331090002 HH PPS REVENUE DEBIT

## 2017-11-28 PROCEDURE — G0157 HHC PT ASSISTANT EA 15: HCPCS

## 2017-11-29 ENCOUNTER — HOME CARE VISIT (OUTPATIENT)
Dept: SCHEDULING | Facility: HOME HEALTH | Age: 52
End: 2017-11-29
Payer: OTHER GOVERNMENT

## 2017-11-29 VITALS
DIASTOLIC BLOOD PRESSURE: 70 MMHG | SYSTOLIC BLOOD PRESSURE: 102 MMHG | TEMPERATURE: 98.5 F | OXYGEN SATURATION: 98 % | HEART RATE: 88 BPM

## 2017-11-29 PROCEDURE — 3331090002 HH PPS REVENUE DEBIT

## 2017-11-29 PROCEDURE — 3331090001 HH PPS REVENUE CREDIT

## 2017-11-29 PROCEDURE — G0157 HHC PT ASSISTANT EA 15: HCPCS

## 2017-11-30 ENCOUNTER — HOME CARE VISIT (OUTPATIENT)
Dept: SCHEDULING | Facility: HOME HEALTH | Age: 52
End: 2017-11-30
Payer: OTHER GOVERNMENT

## 2017-11-30 VITALS
TEMPERATURE: 98.1 F | SYSTOLIC BLOOD PRESSURE: 104 MMHG | DIASTOLIC BLOOD PRESSURE: 68 MMHG | HEART RATE: 98 BPM | SYSTOLIC BLOOD PRESSURE: 106 MMHG | DIASTOLIC BLOOD PRESSURE: 68 MMHG | TEMPERATURE: 98.6 F | OXYGEN SATURATION: 99 % | HEART RATE: 93 BPM | OXYGEN SATURATION: 98 %

## 2017-11-30 PROCEDURE — 3331090002 HH PPS REVENUE DEBIT

## 2017-11-30 PROCEDURE — 3331090001 HH PPS REVENUE CREDIT

## 2017-11-30 PROCEDURE — G0157 HHC PT ASSISTANT EA 15: HCPCS

## 2017-12-01 ENCOUNTER — HOME CARE VISIT (OUTPATIENT)
Dept: SCHEDULING | Facility: HOME HEALTH | Age: 52
End: 2017-12-01
Payer: OTHER GOVERNMENT

## 2017-12-01 PROCEDURE — G0157 HHC PT ASSISTANT EA 15: HCPCS

## 2017-12-01 PROCEDURE — 3331090002 HH PPS REVENUE DEBIT

## 2017-12-01 PROCEDURE — 3331090001 HH PPS REVENUE CREDIT

## 2017-12-02 ENCOUNTER — HOME CARE VISIT (OUTPATIENT)
Dept: SCHEDULING | Facility: HOME HEALTH | Age: 52
End: 2017-12-02
Payer: OTHER GOVERNMENT

## 2017-12-02 VITALS
OXYGEN SATURATION: 98 % | HEART RATE: 98 BPM | SYSTOLIC BLOOD PRESSURE: 106 MMHG | TEMPERATURE: 98.7 F | HEART RATE: 88 BPM | DIASTOLIC BLOOD PRESSURE: 70 MMHG | TEMPERATURE: 97.9 F | OXYGEN SATURATION: 100 %

## 2017-12-02 PROCEDURE — 3331090002 HH PPS REVENUE DEBIT

## 2017-12-02 PROCEDURE — G0157 HHC PT ASSISTANT EA 15: HCPCS

## 2017-12-02 PROCEDURE — 3331090001 HH PPS REVENUE CREDIT

## 2017-12-03 ENCOUNTER — HOME CARE VISIT (OUTPATIENT)
Dept: SCHEDULING | Facility: HOME HEALTH | Age: 52
End: 2017-12-03
Payer: OTHER GOVERNMENT

## 2017-12-03 ENCOUNTER — HOME CARE VISIT (OUTPATIENT)
Dept: HOME HEALTH SERVICES | Facility: HOME HEALTH | Age: 52
End: 2017-12-03
Payer: OTHER GOVERNMENT

## 2017-12-03 VITALS
OXYGEN SATURATION: 98 % | TEMPERATURE: 97.9 F | DIASTOLIC BLOOD PRESSURE: 70 MMHG | SYSTOLIC BLOOD PRESSURE: 98 MMHG | HEART RATE: 100 BPM

## 2017-12-03 PROCEDURE — 3331090001 HH PPS REVENUE CREDIT

## 2017-12-03 PROCEDURE — G0157 HHC PT ASSISTANT EA 15: HCPCS

## 2017-12-03 PROCEDURE — 3331090002 HH PPS REVENUE DEBIT

## 2017-12-04 VITALS
SYSTOLIC BLOOD PRESSURE: 118 MMHG | RESPIRATION RATE: 16 BRPM | DIASTOLIC BLOOD PRESSURE: 74 MMHG | HEART RATE: 90 BPM | TEMPERATURE: 98.2 F | OXYGEN SATURATION: 98 %

## 2017-12-04 PROCEDURE — 3331090001 HH PPS REVENUE CREDIT

## 2017-12-04 PROCEDURE — 3331090002 HH PPS REVENUE DEBIT

## 2017-12-05 PROCEDURE — 3331090002 HH PPS REVENUE DEBIT

## 2017-12-05 PROCEDURE — 3331090001 HH PPS REVENUE CREDIT

## 2018-01-23 ENCOUNTER — HOSPITAL ENCOUNTER (OUTPATIENT)
Dept: MAMMOGRAPHY | Age: 53
Discharge: HOME | End: 2018-01-23
Attending: FAMILY MEDICINE
Payer: OTHER GOVERNMENT

## 2018-01-23 DIAGNOSIS — Z12.31 VISIT FOR SCREENING MAMMOGRAM: ICD-10-CM

## 2018-01-23 PROCEDURE — 77067 SCR MAMMO BI INCL CAD: CPT

## 2018-05-15 ENCOUNTER — HOSPITAL ENCOUNTER (OUTPATIENT)
Dept: VASCULAR SURGERY | Age: 53
Discharge: HOME | End: 2018-05-15
Attending: PODIATRIST
Payer: OTHER GOVERNMENT

## 2018-05-15 DIAGNOSIS — I70.222 ATHEROSCLEROSIS OF NATIVE ARTERY OF LEFT LOWER EXTREMITY WITH REST PAIN (HCC): ICD-10-CM

## 2018-05-15 PROCEDURE — 93922 UPR/L XTREMITY ART 2 LEVELS: CPT

## 2018-06-27 ENCOUNTER — HOSPITAL ENCOUNTER (OUTPATIENT)
Dept: MRI IMAGING | Age: 53
Discharge: HOME | End: 2018-06-27
Attending: ORTHOPAEDIC SURGERY
Payer: OTHER GOVERNMENT

## 2018-06-27 DIAGNOSIS — M89.9 BONE LESION: ICD-10-CM

## 2018-06-27 PROCEDURE — 73721 MRI JNT OF LWR EXTRE W/O DYE: CPT

## 2018-08-10 ENCOUNTER — HOSPITAL ENCOUNTER (OUTPATIENT)
Dept: PREADMISSION TESTING | Age: 53
Discharge: HOME | End: 2018-08-10
Payer: OTHER GOVERNMENT

## 2018-08-10 ENCOUNTER — HOSPITAL ENCOUNTER (OUTPATIENT)
Dept: GENERAL RADIOLOGY | Age: 53
Discharge: HOME | End: 2018-08-10
Payer: OTHER GOVERNMENT

## 2018-08-10 DIAGNOSIS — Z01.818 PREOP TESTING: ICD-10-CM

## 2018-08-10 DIAGNOSIS — M20.42 HAMMER TOE OF LEFT FOOT: ICD-10-CM

## 2018-08-10 LAB
ANION GAP SERPL CALC-SCNC: 1 MMOL/L (ref 3–18)
APTT PPP: 26.9 SEC (ref 23–36.4)
ATRIAL RATE: 75 BPM
BASOPHILS # BLD: 0 K/UL (ref 0–0.1)
BASOPHILS NFR BLD: 1 % (ref 0–2)
BUN SERPL-MCNC: 12 MG/DL (ref 7–18)
BUN/CREAT SERPL: 17 (ref 12–20)
CALCIUM SERPL-MCNC: 8.7 MG/DL (ref 8.5–10.1)
CALCULATED P AXIS, ECG09: 71 DEGREES
CALCULATED R AXIS, ECG10: 20 DEGREES
CALCULATED T AXIS, ECG11: 49 DEGREES
CHLORIDE SERPL-SCNC: 106 MMOL/L (ref 100–108)
CO2 SERPL-SCNC: 33 MMOL/L (ref 21–32)
CREAT SERPL-MCNC: 0.7 MG/DL (ref 0.6–1.3)
DIAGNOSIS, 93000: NORMAL
DIFFERENTIAL METHOD BLD: ABNORMAL
EOSINOPHIL # BLD: 0.2 K/UL (ref 0–0.4)
EOSINOPHIL NFR BLD: 3 % (ref 0–5)
ERYTHROCYTE [DISTWIDTH] IN BLOOD BY AUTOMATED COUNT: 14.4 % (ref 11.6–14.5)
GLUCOSE SERPL-MCNC: 113 MG/DL (ref 74–99)
HCT VFR BLD AUTO: 43.7 % (ref 35–45)
HGB BLD-MCNC: 14.3 G/DL (ref 12–16)
INR PPP: 0.9 (ref 0.8–1.2)
LYMPHOCYTES # BLD: 1.8 K/UL (ref 0.9–3.6)
LYMPHOCYTES NFR BLD: 39 % (ref 21–52)
MCH RBC QN AUTO: 32.2 PG (ref 24–34)
MCHC RBC AUTO-ENTMCNC: 32.7 G/DL (ref 31–37)
MCV RBC AUTO: 98.4 FL (ref 74–97)
MONOCYTES # BLD: 0.5 K/UL (ref 0.05–1.2)
MONOCYTES NFR BLD: 11 % (ref 3–10)
NEUTS SEG # BLD: 2.1 K/UL (ref 1.8–8)
NEUTS SEG NFR BLD: 46 % (ref 40–73)
P-R INTERVAL, ECG05: 152 MS
PLATELET # BLD AUTO: 241 K/UL (ref 135–420)
PMV BLD AUTO: 9 FL (ref 9.2–11.8)
POTASSIUM SERPL-SCNC: 5.6 MMOL/L (ref 3.5–5.5)
PROTHROMBIN TIME: 12.1 SEC (ref 11.5–15.2)
Q-T INTERVAL, ECG07: 388 MS
QRS DURATION, ECG06: 90 MS
QTC CALCULATION (BEZET), ECG08: 433 MS
RBC # BLD AUTO: 4.44 M/UL (ref 4.2–5.3)
SODIUM SERPL-SCNC: 140 MMOL/L (ref 136–145)
VENTRICULAR RATE, ECG03: 75 BPM
WBC # BLD AUTO: 4.6 K/UL (ref 4.6–13.2)

## 2018-08-10 PROCEDURE — 85025 COMPLETE CBC W/AUTO DIFF WBC: CPT | Performed by: PODIATRIST

## 2018-08-10 PROCEDURE — 85730 THROMBOPLASTIN TIME PARTIAL: CPT | Performed by: PODIATRIST

## 2018-08-10 PROCEDURE — 71046 X-RAY EXAM CHEST 2 VIEWS: CPT

## 2018-08-10 PROCEDURE — 80048 BASIC METABOLIC PNL TOTAL CA: CPT | Performed by: PODIATRIST

## 2018-08-10 PROCEDURE — 85610 PROTHROMBIN TIME: CPT | Performed by: PODIATRIST

## 2018-08-10 PROCEDURE — 93005 ELECTROCARDIOGRAM TRACING: CPT

## 2018-08-10 PROCEDURE — 36415 COLL VENOUS BLD VENIPUNCTURE: CPT | Performed by: PODIATRIST

## 2018-09-07 ENCOUNTER — ANESTHESIA EVENT (OUTPATIENT)
Dept: SURGERY | Age: 53
End: 2018-09-07
Payer: OTHER GOVERNMENT

## 2018-09-10 ENCOUNTER — HOME HEALTH ADMISSION (OUTPATIENT)
Dept: HOME HEALTH SERVICES | Facility: HOME HEALTH | Age: 53
End: 2018-09-10
Payer: OTHER GOVERNMENT

## 2018-09-10 ENCOUNTER — HOSPITAL ENCOUNTER (OUTPATIENT)
Age: 53
Setting detail: OUTPATIENT SURGERY
Discharge: HOME | End: 2018-09-10
Attending: PODIATRIST | Admitting: PODIATRIST
Payer: OTHER GOVERNMENT

## 2018-09-10 ENCOUNTER — ANESTHESIA (OUTPATIENT)
Dept: SURGERY | Age: 53
End: 2018-09-10
Payer: OTHER GOVERNMENT

## 2018-09-10 VITALS
RESPIRATION RATE: 20 BRPM | HEIGHT: 63 IN | HEART RATE: 86 BPM | WEIGHT: 116 LBS | OXYGEN SATURATION: 96 % | TEMPERATURE: 97.3 F | SYSTOLIC BLOOD PRESSURE: 122 MMHG | DIASTOLIC BLOOD PRESSURE: 77 MMHG | BODY MASS INDEX: 20.55 KG/M2

## 2018-09-10 DIAGNOSIS — M05.79 RHEUMATOID ARTHRITIS INVOLVING MULTIPLE SITES WITH POSITIVE RHEUMATOID FACTOR (HCC): Primary | ICD-10-CM

## 2018-09-10 LAB
BUN BLD-MCNC: 14 MG/DL (ref 7–18)
CHLORIDE BLD-SCNC: 104 MMOL/L (ref 100–108)
GLUCOSE BLD STRIP.AUTO-MCNC: 101 MG/DL (ref 74–106)
HCG UR QL: NEGATIVE
HCT VFR BLD CALC: 36 % (ref 36–49)
HGB BLD-MCNC: 12.2 G/DL (ref 12–16)
POTASSIUM BLD-SCNC: 3.2 MMOL/L (ref 3.5–5.5)
SODIUM BLD-SCNC: 137 MMOL/L (ref 136–145)

## 2018-09-10 PROCEDURE — 76210000021 HC REC RM PH II 0.5 TO 1 HR: Performed by: PODIATRIST

## 2018-09-10 PROCEDURE — 76010000149 HC OR TIME 1 TO 1.5 HR: Performed by: PODIATRIST

## 2018-09-10 PROCEDURE — 76210000006 HC OR PH I REC 0.5 TO 1 HR: Performed by: PODIATRIST

## 2018-09-10 PROCEDURE — 77030008833 HC WRE K BRSM -A: Performed by: PODIATRIST

## 2018-09-10 PROCEDURE — 77030013179 HC SHOE PSTOP OPN DJOR -A: Performed by: PODIATRIST

## 2018-09-10 PROCEDURE — 74011250637 HC RX REV CODE- 250/637: Performed by: NURSE ANESTHETIST, CERTIFIED REGISTERED

## 2018-09-10 PROCEDURE — 77030020833 HC CAP PROTCT PIN ASPN -A: Performed by: PODIATRIST

## 2018-09-10 PROCEDURE — 77030018836 HC SOL IRR NACL ICUM -A: Performed by: PODIATRIST

## 2018-09-10 PROCEDURE — 77030012012 HC AIRWY OP SFT TELE -A: Performed by: ANESTHESIOLOGY

## 2018-09-10 PROCEDURE — 76060000033 HC ANESTHESIA 1 TO 1.5 HR: Performed by: PODIATRIST

## 2018-09-10 PROCEDURE — 74011250636 HC RX REV CODE- 250/636

## 2018-09-10 PROCEDURE — 74011000250 HC RX REV CODE- 250

## 2018-09-10 PROCEDURE — 74011250636 HC RX REV CODE- 250/636: Performed by: NURSE ANESTHETIST, CERTIFIED REGISTERED

## 2018-09-10 PROCEDURE — 84295 ASSAY OF SERUM SODIUM: CPT

## 2018-09-10 PROCEDURE — 77030020782 HC GWN BAIR PAWS FLX 3M -B: Performed by: PODIATRIST

## 2018-09-10 PROCEDURE — 77030006773 HC BLD SAW OSC BRSM -A: Performed by: PODIATRIST

## 2018-09-10 PROCEDURE — 81025 URINE PREGNANCY TEST: CPT

## 2018-09-10 PROCEDURE — 77030013480 HC CUF TRNQT J&J -B: Performed by: PODIATRIST

## 2018-09-10 PROCEDURE — 77030032490 HC SLV COMPR SCD KNE COVD -B: Performed by: PODIATRIST

## 2018-09-10 PROCEDURE — 77030010509 HC AIRWY LMA MSK TELE -A: Performed by: ANESTHESIOLOGY

## 2018-09-10 PROCEDURE — 74011250636 HC RX REV CODE- 250/636: Performed by: PODIATRIST

## 2018-09-10 PROCEDURE — 77030031139 HC SUT VCRL2 J&J -A: Performed by: PODIATRIST

## 2018-09-10 DEVICE — IMPLANTABLE DEVICE: Type: IMPLANTABLE DEVICE | Status: FUNCTIONAL

## 2018-09-10 RX ORDER — SODIUM CHLORIDE 0.9 % (FLUSH) 0.9 %
5-10 SYRINGE (ML) INJECTION AS NEEDED
Status: DISCONTINUED | OUTPATIENT
Start: 2018-09-10 | End: 2018-09-10 | Stop reason: HOSPADM

## 2018-09-10 RX ORDER — GLYCOPYRROLATE 0.2 MG/ML
INJECTION INTRAMUSCULAR; INTRAVENOUS AS NEEDED
Status: DISCONTINUED | OUTPATIENT
Start: 2018-09-10 | End: 2018-09-10 | Stop reason: HOSPADM

## 2018-09-10 RX ORDER — ONDANSETRON 2 MG/ML
4 INJECTION INTRAMUSCULAR; INTRAVENOUS ONCE
Status: DISCONTINUED | OUTPATIENT
Start: 2018-09-10 | End: 2018-09-10 | Stop reason: HOSPADM

## 2018-09-10 RX ORDER — LIDOCAINE HYDROCHLORIDE 20 MG/ML
INJECTION, SOLUTION EPIDURAL; INFILTRATION; INTRACAUDAL; PERINEURAL AS NEEDED
Status: DISCONTINUED | OUTPATIENT
Start: 2018-09-10 | End: 2018-09-10 | Stop reason: HOSPADM

## 2018-09-10 RX ORDER — FENTANYL CITRATE 50 UG/ML
25 INJECTION, SOLUTION INTRAMUSCULAR; INTRAVENOUS
Status: COMPLETED | OUTPATIENT
Start: 2018-09-10 | End: 2018-09-10

## 2018-09-10 RX ORDER — PROPOFOL 10 MG/ML
INJECTION, EMULSION INTRAVENOUS AS NEEDED
Status: DISCONTINUED | OUTPATIENT
Start: 2018-09-10 | End: 2018-09-10 | Stop reason: HOSPADM

## 2018-09-10 RX ORDER — ONDANSETRON 2 MG/ML
INJECTION INTRAMUSCULAR; INTRAVENOUS AS NEEDED
Status: DISCONTINUED | OUTPATIENT
Start: 2018-09-10 | End: 2018-09-10 | Stop reason: HOSPADM

## 2018-09-10 RX ORDER — OXYCODONE AND ACETAMINOPHEN 5; 325 MG/1; MG/1
1 TABLET ORAL
Status: DISCONTINUED | OUTPATIENT
Start: 2018-09-10 | End: 2018-09-10 | Stop reason: HOSPADM

## 2018-09-10 RX ORDER — SODIUM CHLORIDE, SODIUM LACTATE, POTASSIUM CHLORIDE, CALCIUM CHLORIDE 600; 310; 30; 20 MG/100ML; MG/100ML; MG/100ML; MG/100ML
75 INJECTION, SOLUTION INTRAVENOUS CONTINUOUS
Status: DISCONTINUED | OUTPATIENT
Start: 2018-09-11 | End: 2018-09-10 | Stop reason: HOSPADM

## 2018-09-10 RX ORDER — CEFAZOLIN SODIUM 2 G/50ML
2 SOLUTION INTRAVENOUS ONCE
Status: DISCONTINUED | OUTPATIENT
Start: 2018-09-10 | End: 2018-09-10 | Stop reason: HOSPADM

## 2018-09-10 RX ORDER — DEXAMETHASONE SODIUM PHOSPHATE 4 MG/ML
INJECTION, SOLUTION INTRA-ARTICULAR; INTRALESIONAL; INTRAMUSCULAR; INTRAVENOUS; SOFT TISSUE AS NEEDED
Status: DISCONTINUED | OUTPATIENT
Start: 2018-09-10 | End: 2018-09-10 | Stop reason: HOSPADM

## 2018-09-10 RX ORDER — FAMOTIDINE 20 MG/1
20 TABLET, FILM COATED ORAL ONCE
Status: COMPLETED | OUTPATIENT
Start: 2018-09-10 | End: 2018-09-10

## 2018-09-10 RX ORDER — MIDAZOLAM HYDROCHLORIDE 1 MG/ML
INJECTION, SOLUTION INTRAMUSCULAR; INTRAVENOUS AS NEEDED
Status: DISCONTINUED | OUTPATIENT
Start: 2018-09-10 | End: 2018-09-10 | Stop reason: HOSPADM

## 2018-09-10 RX ORDER — OXYCODONE AND ACETAMINOPHEN 5; 325 MG/1; MG/1
1 TABLET ORAL
Qty: 30 TAB | Refills: 0 | Status: SHIPPED | OUTPATIENT
Start: 2018-09-10 | End: 2020-01-06

## 2018-09-10 RX ADMIN — FENTANYL CITRATE 25 MCG: 50 INJECTION INTRAMUSCULAR; INTRAVENOUS at 09:14

## 2018-09-10 RX ADMIN — ONDANSETRON 4 MG: 2 INJECTION INTRAMUSCULAR; INTRAVENOUS at 08:37

## 2018-09-10 RX ADMIN — FENTANYL CITRATE 25 MCG: 50 INJECTION INTRAMUSCULAR; INTRAVENOUS at 09:19

## 2018-09-10 RX ADMIN — PROPOFOL 160 MG: 10 INJECTION, EMULSION INTRAVENOUS at 07:51

## 2018-09-10 RX ADMIN — FAMOTIDINE 20 MG: 20 TABLET, FILM COATED ORAL at 06:45

## 2018-09-10 RX ADMIN — GLYCOPYRROLATE 0.1 MG: 0.2 INJECTION INTRAMUSCULAR; INTRAVENOUS at 07:45

## 2018-09-10 RX ADMIN — FENTANYL CITRATE 25 MCG: 50 INJECTION INTRAMUSCULAR; INTRAVENOUS at 09:24

## 2018-09-10 RX ADMIN — MIDAZOLAM HYDROCHLORIDE 2 MG: 1 INJECTION, SOLUTION INTRAMUSCULAR; INTRAVENOUS at 07:45

## 2018-09-10 RX ADMIN — DEXAMETHASONE SODIUM PHOSPHATE 4 MG: 4 INJECTION, SOLUTION INTRA-ARTICULAR; INTRALESIONAL; INTRAMUSCULAR; INTRAVENOUS; SOFT TISSUE at 07:55

## 2018-09-10 RX ADMIN — SODIUM CHLORIDE, SODIUM LACTATE, POTASSIUM CHLORIDE, AND CALCIUM CHLORIDE 75 ML/HR: 600; 310; 30; 20 INJECTION, SOLUTION INTRAVENOUS at 06:35

## 2018-09-10 RX ADMIN — LIDOCAINE HYDROCHLORIDE 50 MG: 20 INJECTION, SOLUTION EPIDURAL; INFILTRATION; INTRACAUDAL; PERINEURAL at 07:51

## 2018-09-10 RX ADMIN — FENTANYL CITRATE 25 MCG: 50 INJECTION INTRAMUSCULAR; INTRAVENOUS at 09:09

## 2018-09-10 NOTE — PROGRESS NOTES
conducted a pre-surgery visit with Jewell Harris, who is a 52 y.o.,female. The  provided the following Interventions: 
Initiated a relationship of care and support. Plan: 
Chaplains will continue to follow and will provide pastoral care on an as needed/requested basis.  recommends bedside caregivers page  on duty if patient shows signs of acute spiritual or emotional distress. [address] [facility] [address] [place] [address] Spiritual Care  
[phone]

## 2018-09-10 NOTE — DISCHARGE INSTRUCTIONS
INSTRUCTIONS FOLLOWING FOOT SURGERY    ACTIVITY:  · Elevate feet for 48 hours  · Use ice as directed by your doctor  · Use crutches as directed by your doctor    DIET:  · Clear liquids until no nausea or vomiting; then light diet for the first day  · An advance to regular diet On second day, unless your doctor orders otherwise. PAIN:  · Take pain medication as directed by your doctor. · Call your doctor if pain is not relieved by medication. · Do not take aspirin or blood thinners until directed by your doctor. DRESSING CARE: Leave clean, dry and in place until follow up. FOLLOW-UP PHONE CALLS:  · Calls will be made by nursing staff. · If you had any problems, call your doctor as needed. CALL YOUR DOCTOR IF:  · Excessive bleeding that does not stop after holding mild pressure over the area  · Temperature of 101° F or above  · Redness, excessive swelling or bruising, and/or green or yellow, smelly discharge from incision  · Loss of sensation -cold, white, or blue toes    AFTER ANESTHESIA :   · For the first 24 hours: do not drive, drink alcoholic beverages, or make important decisions. · Be aware of dizziness following anesthesia and while taking pain medication. Narcotic-Analgesic/Acetaminophen (Percocet, Norco, Lorcet HD, Lortab 10/325) - (By mouth)   Why this medicine is used:   Relieves pain. Contact a nurse or doctor right away if you have:  · Extreme weakness, shallow breathing, slow heartbeat  · Severe confusion, lightheadedness, dizziness, fainting  · Yellow skin or eyes, dark urine or pale stools  · Severe constipation, severe stomach pain, nausea, vomiting, loss of appetite  · Sweating or cold, clammy skin     Common side effects:  · Mild constipation, nausea, vomiting  · Sleepiness, tiredness  · Itching, rash  © 2017 [address]  Information is for End User's use only and may not be sold, redistributed or otherwise used for commercial purposes.     DISCHARGE SUMMARY from Nurse    PATIENT INSTRUCTIONS:    After general anesthesia or intravenous sedation, for 24 hours or while taking prescription Narcotics:  · Limit your activities  · Do not drive and operate hazardous machinery  · Do not make important personal or business decisions  · Do  not drink alcoholic beverages  · If you have not urinated within 8 hours after discharge, please contact your surgeon on call. Report the following to your surgeon:  · Excessive pain, swelling, redness or odor of or around the surgical area  · Temperature over 100.5  · Nausea and vomiting lasting longer than 4 hours or if unable to take medications  · Any signs of decreased circulation or nerve impairment to extremity: change in color, persistent  numbness, tingling, coldness or increase pain  · Any questions    *  Please give a list of your current medications to your Primary Care Provider. *  Please update this list whenever your medications are discontinued, doses are      changed, or new medications (including over-the-counter products) are added. *  Please carry medication information at all times in case of emergency situations. These are general instructions for a healthy lifestyle:    No smoking/ No tobacco products/ Avoid exposure to second hand smoke  Surgeon General's Warning:  Quitting smoking now greatly reduces serious risk to your health. Obesity, smoking, and sedentary lifestyle greatly increases your risk for illness    A healthy diet, regular physical exercise & weight monitoring are important for maintaining a healthy lifestyle    You may be retaining fluid if you have a history of heart failure or if you experience any of the following symptoms:  Weight gain of 3 pounds or more overnight or 5 pounds in a week, increased swelling in our hands or feet or shortness of breath while lying flat in bed.   Please call your doctor as soon as you notice any of these symptoms; do not wait until your next office visit. Recognize signs and symptoms of STROKE:    F-face looks uneven    A-arms unable to move or move unevenly    S-speech slurred or non-existent    T-time-call 911 as soon as signs and symptoms begin-DO NOT go       Back to bed or wait to see if you get better-TIME IS BRAIN. Warning Signs of HEART ATTACK     Call 911 if you have these symptoms:   Chest discomfort. Most heart attacks involve discomfort in the center of the chest that lasts more than a few minutes, or that goes away and comes back. It can feel like uncomfortable pressure, squeezing, fullness, or pain.  Discomfort in other areas of the upper body. Symptoms can include pain or discomfort in one or both arms, the back, neck, jaw, or stomach.  Shortness of breath with or without chest discomfort.  Other signs may include breaking out in a cold sweat, nausea, or lightheadedness. Don't wait more than five minutes to call 911 - MINUTES MATTER! Fast action can save your life. Calling 911 is almost always the fastest way to get lifesaving treatment. Emergency Medical Services staff can begin treatment when they arrive -- up to an hour sooner than if someone gets to the hospital by car. The discharge information has been reviewed with the patient/friend. The patient/friend verbalized understanding. Discharge medications reviewed with the patient/friend and appropriate educational materials and side effects teaching were provided.   ___________________________________________________________________________________________________________________________________

## 2018-09-10 NOTE — BRIEF OP NOTE
BRIEF OPERATIVE NOTE Date of Procedure: 9/10/2018 Preoperative Diagnosis: M20.42 Postoperative Diagnosis: Hammertoe Procedure(s): 
PARTIAL REMOVAL BONE AND JOINTS WITH FUSION BY METAL PIN OR SCREW FIXATION/LEFT FOOT Surgeon(s) and Role: Ai Hernandez DPM - Primary Surgical Assistant: Holly Andrade 
 
Surgical Staff: 
Circ-1: Catherine Montenegro RN Scrub Tech-1: Melchor Patel Surg Asst-1: Alexei Gaviria Event Time In Incision Start 7624 Incision Close 3913 Anesthesia: General  
Estimated Blood Loss: 0 Specimens: * No specimens in log * Findings: dislocated toes Complications: none Implants:  
Implant Name Type Inv. Item Serial No.  Lot No. [id number] No. Used Action Plain single trocar point 0.45     N/A 19 Luz Elena Arnett N/A Left 2 Implanted

## 2018-09-10 NOTE — IP AVS SNAPSHOT
[address] [id number] [address] Patient: Chris Navarrete MRN: [id number] UUP:[id number] About your hospitalization You were admitted on:  September 10, 2018 You last received care in the:  [facility] PHASE 2 RECOVERY You were discharged on:  September 10, 2018 Why you were hospitalized Your primary diagnosis was:  Not on File Follow-up Information Follow up With Details Comments Contact Info Darrick Roque MD   [address] [facility] [address] [address] 
[phone] Dilcia Miranda DPM  Follow up in one week. [address] [facility] [place]. [address] [facility] [address] 
[phone] [address] [facility] 3512 [address] [facility] [address] 
[phone] Discharge Orders Procedure Order Date Status Priority Quantity Spec Type Associated Dx NURSING-MISCELLANEOUS: PT and home fall evaluateion and please dispense short leg cam walking boot. 09/10/18 0858 Normal Routine 1  Rheumatoid arthritis involving multiple sites with positive rheumatoid factor ([facility] [address] [[id number]] Questions: Description of Order:  PT and home fall evaluateion and please dispense short leg cam walking boot. A check bharath indicates which time of day the medication should be taken. My Medications START taking these medications Instructions Each Dose to Equal  
 Morning Noon Evening Bedtime  
 oxyCODONE-acetaminophen 5-325 mg per tablet Commonly known as:  PERCOCET Your last dose was: Your next dose is: Take 1 Tab by mouth every four (4) hours as needed for Pain. Max Daily Amount: 6 Tabs. 1 Tab CONTINUE taking these medications Instructions Each Dose to Equal  
 Morning Noon Evening Bedtime ACTEMRA 400 mg/20 mL (20 mg/mL) Soln Generic drug: Tocilizumab Your last dose was: Your next dose is:    
   
   
 by IntraVENous route every thirty (30) days. Indications: Rheumatoid Arthritis ADDERALL XR 30 mg XR capsule Generic drug:  amphetamine-dextroamphetamine XR Your last dose was: Your next dose is: Take 60 mg by mouth daily. 60 mg FORTEO 20 mcg/dose - 600 mcg/2.4 mL Pnij injection Generic drug:  teriparatide Your last dose was: Your next dose is:    
   
   
 20 mcg by SubCUTAneous route daily. 20 mcg  
    
   
   
   
  
 gabapentin 300 mg capsule Commonly known as:  NEURONTIN Your last dose was: Your next dose is: Take 900 mg by mouth nightly. 900 mg KLOR-CON M20 20 mEq tablet Generic drug:  potassium chloride Your last dose was: Your next dose is: Take 20 mEq by mouth daily (with dinner). 20 mEq  
    
   
   
   
  
 ondansetron 4 mg disintegrating tablet Commonly known as:  ZOFRAN ODT Your last dose was: Your next dose is: Take 1-2 Tabs by mouth every eight (8) hours as needed. Indications: PREVENTION OF POST-OPERATIVE NAUSEA AND VOMITING  
 4-8 mg  
    
   
   
   
  
 oxyCODONE IR 5 mg immediate release tablet Commonly known as:  Isabelle Retana Your last dose was: Your next dose is: Take 1-3 Tabs by mouth every four (4) hours as needed. Max Daily Amount: 90 mg.  
 5-15 mg  
    
   
   
   
  
 RASUVO (PF) 20 mg/0.4 mL Atin Generic drug:  methotrexate (PF) Your last dose was: Your next dose is:    
   
   
 by SubCUTAneous route every seven (7) days. Indications: Rheumatoid Arthritis  
     
   
   
   
  
 topiramate 50 mg tablet Commonly known as:  TOPAMAX Your last dose was: Your next dose is: Take 50 mg by mouth nightly. 50 mg  
    
   
   
   
  
 venlafaxine 75 mg tablet Commonly known as:  [facility] Your last dose was: Your next dose is: Take 75 mg by mouth two (2) times a day. Indications: hot flashes 75 mg  
    
   
   
   
  
 VITAMIN B-12 1,000 mcg/mL injection Generic drug:  cyanocobalamin Your last dose was: Your next dose is:    
   
   
 1,000 mcg by IntraMUSCular route every month. 1000 mcg Where to Get Your Medications Information on where to get these meds will be given to you by the nurse or doctor. ! Ask your nurse or doctor about these medications  
  oxyCODONE-acetaminophen 5-325 mg per tablet Opioid Education Prescription Opioids: What You Need to Know: 
 
 
ACTIVITY: 
· Elevate feet for 48 hours · Use ice as directed by your doctor · Use crutches as directed by your doctor DIET: 
· Clear liquids until no nausea or vomiting; then light diet for the first day · An advance to regular diet On second day, unless your doctor orders otherwise. PAIN: 
· Take pain medication as directed by your doctor. · Call your doctor if pain is not relieved by medication. · Do not take aspirin or blood thinners until directed by your doctor. DRESSING CARE: Leave clean, dry and in place until follow up. FOLLOW-UP PHONE CALLS: 
· Calls will be made by nursing staff. · If you had any problems, call your doctor as needed. CALL YOUR DOCTOR IF: 
· Excessive bleeding that does not stop after holding mild pressure over the area · Temperature of 101° F or above · Redness, excessive swelling or bruising, and/or green or yellow, smelly discharge from incision · Loss of sensation cold, white, or blue toes AFTER ANESTHESIA :  
· For the first 24 hours: do not drive, drink alcoholic beverages, or make important decisions. · Be aware of dizziness following anesthesia and while taking pain medication. Narcotic-Analgesic/Acetaminophen (Percocet, Norco, Lorcet HD, Lortab 10/325) - (By mouth) Why this medicine is used:  
Relieves pain. Contact a nurse or doctor right away if you have: 
· Extreme weakness, shallow breathing, slow heartbeat · Severe confusion, lightheadedness, dizziness, fainting · Yellow skin or eyes, dark urine or pale stools · Severe constipation, severe stomach pain, nausea, vomiting, loss of appetite · Sweating or cold, clammy skin Common side effects: · Mild constipation, nausea, vomiting · Sleepiness, tiredness · Itching, rash © 2017 Formerly named [facility] Information is for End User's use only and may not be sold, redistributed or otherwise used for commercial purposes. DISCHARGE SUMMARY from Nurse PATIENT INSTRUCTIONS: 
 
 
F-face looks uneven A-arms unable to move or move unevenly S-speech slurred or non-existent T-time-call 911 as soon as signs and symptoms begin-DO NOT go Back to bed or wait to see if you get better-TIME IS BRAIN. Warning Signs of HEART ATTACK Call 911 if you have these symptoms: 
? Chest discomfort. Most heart attacks involve discomfort in the center of the chest that lasts more than a few minutes, or that goes away and comes back. It can feel like uncomfortable pressure, squeezing, fullness, or pain. ? Discomfort in other areas of the upper body. Symptoms can include pain or discomfort in one or both arms, the back, neck, jaw, or stomach. ? Shortness of breath with or without chest discomfort. ? Other signs may include breaking out in a cold sweat, nausea, or lightheadedness. Don't wait more than five minutes to call [address]! Fast action can save your life. Calling 911 is almost always the fastest way to get lifesaving treatment. Emergency Medical Services staff can begin treatment when they arrive  up to an hour sooner than if someone gets to the hospital by car. The discharge information has been reviewed with the patient/friend. The patient/friend verbalized understanding. Discharge medications reviewed with the patient/friend and appropriate educational materials and side effects teaching were provided. ___________________________________________________________________________________________________________________________________ Introducing Malachi Kumar As a [employer] patient, I wanted to make you aware of our electronic visit tool called Malachi Kumar. [employer] 24/7 allows you to connect within minutes with a medical provider 24 hours a day, seven days a week via a mobile device or tablet or logging into a secure website from your computer. You can access Malachi Kumar from anywhere in the [place]. A virtual visit might be right for you when you have a simple condition and feel like you just dont want to get out of bed, or cant get away from work for an appointment, when your regular [employer] provider is not available (evenings, weekends or holidays), or when youre out of town and need minor care. Electronic visits cost only $49 and if the [employer] 24/7 provider determines a prescription is needed to treat your condition, one can be electronically transmitted to a nearby pharmacy*. Please take a moment to enroll today if you have not already done so. The enrollment process is free and takes just a few minutes. To enroll, please download the [employer] 24/7 annette to your tablet or phone, or visit [email or web address] to enroll on your computer. And, as an [address] [place] [address] patient with a [employer] account, the results of your visits will be scanned into your electronic medical record and your primary care provider will be able to view the scanned results. We urge you to continue to see your regular [employer] provider for your ongoing medical care. And while your primary care provider may not be the one available when you seek a Malachi Annafin virtual visit, the peace of mind you get from getting a real diagnosis real time can be priceless. For more information on Malachi [employer], view our Frequently Asked Questions (FAQs) at [email or web address]. Sincerely, 
 
Marsha Doll MD 
Chief Medical Officer [facility] Parris Samy *:  certain medications cannot be prescribed via [employer] Providers Seen During Your Hospitalization Provider Specialty Primary office phone Autumn Mccauley DPM Podiatry [phone] Your Primary Care Physician (PCP) Primary Care Physician Office Phone Office Fax Petrona Hawkins [phone] You are allergic to the following Allergen Reactions Sulfa Dyne Rash Itching Recent Documentation Height Weight BMI OB Status Smoking Status 1.6 m 52.6 kg 20.55 kg/m2 Menopause Former Smoker Emergency Contacts Name Discharge Info Relation Home Work Mobile Lefty Zepeda DISCHARGE CAREGIVER [3] Spouse [3] [phone] Patient Belongings The following personal items are in your possession at time of discharge: 
  Dental Appliances: Partials, Uppers  Visual Aid: None      Home Medications: None   Jewelry: Ring (nose ring)  Clothing: Undergarments, Footwear, Dress, Sweater    Other Valuables: None Please provide this summary of care documentation to your next provider.  
  
  
 
  
Signatures-by signing, you are acknowledging that this After Visit Summary has been reviewed with you and you have received a copy. Patient Signature:  ____________________________________________________________ Date:  ____________________________________________________________  
  
Jacqlyn Newcomer Provider Signature:  ____________________________________________________________ Date:  ____________________________________________________________

## 2018-09-10 NOTE — H&P
H&P Update: 
Karma Zepeda was seen and examined. History and physical has been reviewed. The patient has been examined.  There have been no significant clinical changes since the completion of the originally dated History and Physical. 
 
Signed By: Rainer Carlson DPM   
 September 10, 2018 7:37 AM

## 2018-09-10 NOTE — PROGRESS NOTES
Met with pt at b/s in PACU. FOC signed for [id number]. Pt states she has used [employer] in the past. Pt received script for cam boot and will go to medical supply to get this. Friend at b/s and will transport home.  [employer] [facility] called with referral

## 2018-09-10 NOTE — ANESTHESIA PREPROCEDURE EVALUATION
Anesthetic History No history of anesthetic complications Review of Systems / Medical History Patient summary reviewed and pertinent labs reviewed Pulmonary Comments: Ex smoker Neuro/Psych Psychiatric history Comments: Left arm weakness Cardiovascular Within defined limits Exercise tolerance: >4 METS 
  
GI/Hepatic/Renal 
Within defined limits Endo/Other Arthritis Other Findings Comments: Documentation of current medication Current medications obtained, documented and obtained? YES Risk Factors for Postoperative nausea/vomiting: 
     History of postoperative nausea/vomiting? NO Female? YES Motion sickness? NO Intended opioid administration for postoperative analgesia? NO Smoking Abstinence: 
Current Smoker? NO Elective Surgery? YES Seen preoperatively by anesthesiologist or proxy prior to day of surgery? YES Pt abstained from smoking 24 hours prior to anesthesia? YES Preventive care/screening for High Blood Pressure: 
Aged 18 years and older: YES Screened for high blood pressure: YES Patients with high blood pressure referred to primary care provider 
 for BP management: YES Physical Exam 
 
Airway Mallampati: II 
TM Distance: > 6 cm Neck ROM: decreased range of motion Mouth opening: Normal 
 
 Cardiovascular Regular rate and rhythm,  S1 and S2 normal,  no murmur, click, rub, or gallop Dental 
 
Dentition: Upper partial plate Pulmonary Breath sounds clear to auscultation Abdominal 
GI exam deferred Other Findings Anesthetic Plan ASA: 2 Anesthesia type: general 
 
 
Post-op pain plan if not by surgeon: IV PCA Induction: Intravenous Anesthetic plan and risks discussed with: Patient

## 2018-09-10 NOTE — HOME CARE
[facility] received referral for PT/Home Fall Evaluation - noted patient already discharged from PACU to home. Per the patient, address and contact numbers are correct - her son Vickie Lopez resides with her and assists. DME: per patient, she has a \"loads of crutches\", front wheeled walker from previous knee replacement, cane and a shower chair she uses for gardening. Patient told this nurse her spouse Karla Lacey travels a lot but her son lives in the home and her daughter goes to [employer]. Script given to patient prior to discharge for a cam boot but, per patient, she has not gone to the [facility] to obtain the boot stating, \" I just wanted to come home and put my foot up. \" Referral called to [facility] in intake for completion and visit scheduling - KENYATTA Palmer LPN

## 2018-09-10 NOTE — ANESTHESIA POSTPROCEDURE EVALUATION
Post-Anesthesia Evaluation and Assessment Patient: Errol Chan MRN: [id number]  SSN: [id number] YOB: 1965  Age: 52 y.o. Sex: female Cardiovascular Function/Vital Signs Visit Vitals  /76  Pulse 81  Temp 36.7 °C (98.1 °F)  Resp 13  Ht 5' 3\" (1.6 m)  Wt 52.6 kg (116 lb)  SpO2 95%  BMI 20.55 kg/m2 Patient is status post general anesthesia for Procedure(s): 
PARTIAL REMOVAL BONE AND JOINTS WITH FUSION BY METAL PIN OR SCREW FIXATION/LEFT FOOT. Nausea/Vomiting: None Postoperative hydration reviewed and adequate. Pain: 
Pain Scale 1: Numeric (0 - 10) (09/10/18 6917) Pain Intensity 1: 4 (09/10/18 0924) Managed Neurological Status:  
Neuro (WDL): Exceptions to WDL (09/10/18 7302) Neuro Neurologic State: Drowsy (09/10/18 1304) At baseline Mental Status and Level of Consciousness: Arousable Pulmonary Status:  
O2 Device: Room air (09/10/18 0901) Adequate oxygenation and airway patent Complications related to anesthesia: None Post-anesthesia assessment completed. No concerns Signed By: Victor M Reveles MD   
 September 10, 2018

## 2018-09-10 NOTE — DISCHARGE SUMMARY
Patient armband removed and shredded  Patient confirmed by two identifiers with discharge instructions prior too being provided to patient and friend, Deb Hinojosa.

## 2018-09-11 ENCOUNTER — HOME CARE VISIT (OUTPATIENT)
Dept: SCHEDULING | Facility: HOME HEALTH | Age: 53
End: 2018-09-11
Payer: OTHER GOVERNMENT

## 2018-09-11 ENCOUNTER — HOME CARE VISIT (OUTPATIENT)
Dept: HOME HEALTH SERVICES | Facility: HOME HEALTH | Age: 53
End: 2018-09-11

## 2018-09-11 VITALS
OXYGEN SATURATION: 97 % | DIASTOLIC BLOOD PRESSURE: 78 MMHG | HEART RATE: 82 BPM | SYSTOLIC BLOOD PRESSURE: 118 MMHG | TEMPERATURE: 97.2 F

## 2018-09-11 PROCEDURE — 3331090002 HH PPS REVENUE DEBIT

## 2018-09-11 PROCEDURE — 3331090003 HH PPS REVENUE ADJ

## 2018-09-11 PROCEDURE — 400013 HH SOC

## 2018-09-11 PROCEDURE — 3331090001 HH PPS REVENUE CREDIT

## 2018-09-11 PROCEDURE — G0151 HHCP-SERV OF PT,EA 15 MIN: HCPCS

## 2018-09-11 NOTE — OP NOTES
[facility]  OPERATIVE REPORT    Sebastián Bond  MR#: [id number]  : 1965  ACCOUNT #: [de-identified]   DATE OF SERVICE: 09/10/2018    PREOPERATIVE DIAGNOSIS:  Dislocated hammertoes second and third toes, left foot. POSTOPERATIVE DIAGNOSIS:  Dislocated hammertoes second and third toes, left foot. PROCEDURES PERFORMED:  Arthrodesis of proximal interphalangeal joint of 2nd and 3rd toes with internal fixation, left foot. SURGEON:  Catherine Estrella DPM    ASSISTANT:  -    ANESTHESIA:  General.    ESTIMATED BLOOD LOSS:  -     COMPLICATIONS:  -    SPECIMENS REMOVED:  -    IMPLANTS:  -    PROCEDURE:  On 09/10/2018 patient was placed on the operating room table in supine position. After adequate induction of general anesthesia, the left lower extremity was prepped and draped in usual sterile fashion. Under sterile ankle tourniquet hemostasis, attention was directed to the left foot. Second and fifth toes were dislocated. Second toe was then angulated and then distally in an adducted and dorsiflexed position and a third toe was in adductovarus position under the 2nd toe, there had been prior arthrodesis and so much scar tissue. Two very minimal, semielliptical incisions with the toes being fashioned in a corrected position were accomplished. The second toe previous fusion site was entered with a sharp blade and there was just a bunch of scar tissue. This area was rongeured smooth, similarly also on the third toe, neurovascular structures were protected medial and lateral.  These were retracted out of harm's way bluntly. A peg-in-hole fusion of good bone was fashioned from the second toe and then an end-to-end fabrication on the third toe and these were then retrogradely fixated with a 0.045 K wires. There was good alignment and good stability. This was after thorough irrigation. Deep closure with 4-0 Vicryl, skin was approximated with 5-0 Vicryl and skin glue.   Betadine and soft dressing was applied. The patient tolerated the procedure and anesthesia well, vital signs stable throughout. Patient was transported to the recovery room in stable condition.       MADI Camp / [place]  D: 09/10/2018 09:01     T: 09/10/2018 23:57  JOB #: [id number]

## 2018-09-12 ENCOUNTER — HOME CARE VISIT (OUTPATIENT)
Dept: HOME HEALTH SERVICES | Facility: HOME HEALTH | Age: 53
End: 2018-09-12
Payer: OTHER GOVERNMENT

## 2018-09-12 PROCEDURE — 3331090001 HH PPS REVENUE CREDIT

## 2018-09-12 PROCEDURE — 3331090002 HH PPS REVENUE DEBIT

## 2018-09-13 PROCEDURE — 3331090002 HH PPS REVENUE DEBIT

## 2018-09-13 PROCEDURE — 3331090001 HH PPS REVENUE CREDIT

## 2018-09-14 PROCEDURE — 3331090001 HH PPS REVENUE CREDIT

## 2018-09-14 PROCEDURE — 3331090002 HH PPS REVENUE DEBIT

## 2018-09-15 PROCEDURE — 3331090002 HH PPS REVENUE DEBIT

## 2018-09-15 PROCEDURE — 3331090001 HH PPS REVENUE CREDIT

## 2018-09-16 PROCEDURE — 3331090001 HH PPS REVENUE CREDIT

## 2018-09-16 PROCEDURE — 3331090002 HH PPS REVENUE DEBIT

## 2018-09-17 PROCEDURE — 3331090002 HH PPS REVENUE DEBIT

## 2018-09-17 PROCEDURE — 3331090001 HH PPS REVENUE CREDIT

## 2018-09-18 PROCEDURE — 3331090002 HH PPS REVENUE DEBIT

## 2018-09-18 PROCEDURE — 3331090001 HH PPS REVENUE CREDIT

## 2018-09-19 PROCEDURE — 3331090002 HH PPS REVENUE DEBIT

## 2018-09-19 PROCEDURE — 3331090001 HH PPS REVENUE CREDIT

## 2018-09-20 PROCEDURE — 3331090001 HH PPS REVENUE CREDIT

## 2018-09-20 PROCEDURE — 3331090002 HH PPS REVENUE DEBIT

## 2019-01-25 ENCOUNTER — HOSPITAL ENCOUNTER (OUTPATIENT)
Dept: MAMMOGRAPHY | Age: 54
Discharge: HOME | End: 2019-01-25
Payer: OTHER GOVERNMENT

## 2019-01-25 DIAGNOSIS — Z12.39 ENCOUNTER FOR BREAST CANCER SCREENING OTHER THAN MAMMOGRAM: ICD-10-CM

## 2019-01-25 PROCEDURE — 77067 SCR MAMMO BI INCL CAD: CPT

## 2019-11-22 ENCOUNTER — HOSPITAL ENCOUNTER (OUTPATIENT)
Dept: MRI IMAGING | Age: 54
Discharge: HOME | End: 2019-11-22
Attending: PODIATRIST
Payer: OTHER GOVERNMENT

## 2019-11-22 DIAGNOSIS — G57.61 LESION OF RIGHT PLANTAR NERVE: ICD-10-CM

## 2019-11-22 DIAGNOSIS — M20.41 ACQUIRED HAMMER TOE OF RIGHT FOOT: ICD-10-CM

## 2019-11-22 PROCEDURE — 73718 MRI LOWER EXTREMITY W/O DYE: CPT

## 2020-01-06 ENCOUNTER — HOSPITAL ENCOUNTER (OUTPATIENT)
Dept: PREADMISSION TESTING | Age: 55
Discharge: HOME | End: 2020-01-06
Payer: OTHER GOVERNMENT

## 2020-01-06 ENCOUNTER — HOSPITAL ENCOUNTER (OUTPATIENT)
Dept: GENERAL RADIOLOGY | Age: 55
Discharge: HOME | End: 2020-01-06
Payer: OTHER GOVERNMENT

## 2020-01-06 DIAGNOSIS — M20.42 HAMMER TOE OF LEFT FOOT: ICD-10-CM

## 2020-01-06 DIAGNOSIS — G57.62 LESION OF LEFT PLANTAR NERVE: ICD-10-CM

## 2020-01-06 DIAGNOSIS — Z01.818 PREOP TESTING: ICD-10-CM

## 2020-01-06 LAB
ANION GAP SERPL CALC-SCNC: 3 MMOL/L (ref 3–18)
APTT PPP: 26.8 SEC (ref 23–36.4)
ATRIAL RATE: 85 BPM
BASOPHILS # BLD: 0 K/UL (ref 0–0.1)
BASOPHILS NFR BLD: 0 % (ref 0–2)
BUN SERPL-MCNC: 15 MG/DL (ref 7–18)
BUN/CREAT SERPL: 15 (ref 12–20)
CALCIUM SERPL-MCNC: 9.1 MG/DL (ref 8.5–10.1)
CALCULATED P AXIS, ECG09: 27 DEGREES
CALCULATED R AXIS, ECG10: 2 DEGREES
CALCULATED T AXIS, ECG11: 48 DEGREES
CHLORIDE SERPL-SCNC: 107 MMOL/L (ref 100–111)
CO2 SERPL-SCNC: 30 MMOL/L (ref 21–32)
CREAT SERPL-MCNC: 1.01 MG/DL (ref 0.6–1.3)
DIAGNOSIS, 93000: NORMAL
DIFFERENTIAL METHOD BLD: ABNORMAL
EOSINOPHIL # BLD: 0.1 K/UL (ref 0–0.4)
EOSINOPHIL NFR BLD: 3 % (ref 0–5)
ERYTHROCYTE [DISTWIDTH] IN BLOOD BY AUTOMATED COUNT: 13.8 % (ref 11.6–14.5)
GLUCOSE SERPL-MCNC: 86 MG/DL (ref 74–99)
HCT VFR BLD AUTO: 40.3 % (ref 35–45)
HGB BLD-MCNC: 13.2 G/DL (ref 12–16)
INR PPP: 1 (ref 0.8–1.2)
LYMPHOCYTES # BLD: 1.2 K/UL (ref 0.9–3.6)
LYMPHOCYTES NFR BLD: 23 % (ref 21–52)
MCH RBC QN AUTO: 30.8 PG (ref 24–34)
MCHC RBC AUTO-ENTMCNC: 32.8 G/DL (ref 31–37)
MCV RBC AUTO: 94.2 FL (ref 74–97)
MONOCYTES # BLD: 0.5 K/UL (ref 0.05–1.2)
MONOCYTES NFR BLD: 10 % (ref 3–10)
NEUTS SEG # BLD: 3.2 K/UL (ref 1.8–8)
NEUTS SEG NFR BLD: 64 % (ref 40–73)
P-R INTERVAL, ECG05: 120 MS
PLATELET # BLD AUTO: 319 K/UL (ref 135–420)
PMV BLD AUTO: 8.8 FL (ref 9.2–11.8)
POTASSIUM SERPL-SCNC: 3.7 MMOL/L (ref 3.5–5.5)
PROTHROMBIN TIME: 12.6 SEC (ref 11.5–15.2)
Q-T INTERVAL, ECG07: 374 MS
QRS DURATION, ECG06: 80 MS
QTC CALCULATION (BEZET), ECG08: 445 MS
RBC # BLD AUTO: 4.28 M/UL (ref 4.2–5.3)
SODIUM SERPL-SCNC: 140 MMOL/L (ref 136–145)
VENTRICULAR RATE, ECG03: 85 BPM
WBC # BLD AUTO: 4.9 K/UL (ref 4.6–13.2)

## 2020-01-06 PROCEDURE — 80048 BASIC METABOLIC PNL TOTAL CA: CPT

## 2020-01-06 PROCEDURE — 85610 PROTHROMBIN TIME: CPT

## 2020-01-06 PROCEDURE — 36415 COLL VENOUS BLD VENIPUNCTURE: CPT

## 2020-01-06 PROCEDURE — 71046 X-RAY EXAM CHEST 2 VIEWS: CPT

## 2020-01-06 PROCEDURE — 93005 ELECTROCARDIOGRAM TRACING: CPT

## 2020-01-06 PROCEDURE — 85730 THROMBOPLASTIN TIME PARTIAL: CPT

## 2020-01-06 PROCEDURE — 85025 COMPLETE CBC W/AUTO DIFF WBC: CPT

## 2020-01-06 RX ORDER — FOLIC ACID 1 MG/1
1 TABLET ORAL DAILY
COMMUNITY

## 2020-01-06 RX ORDER — ZOLPIDEM TARTRATE 10 MG/1
10 TABLET ORAL
COMMUNITY

## 2020-01-06 RX ORDER — HYDROXYCHLOROQUINE SULFATE 200 MG/1
200 TABLET, FILM COATED ORAL DAILY
COMMUNITY

## 2020-01-06 RX ORDER — ROSUVASTATIN CALCIUM 10 MG/1
10 TABLET, COATED ORAL
COMMUNITY

## 2020-01-10 ENCOUNTER — ANESTHESIA EVENT (OUTPATIENT)
Dept: SURGERY | Age: 55
End: 2020-01-10
Payer: OTHER GOVERNMENT

## 2020-01-13 ENCOUNTER — ANESTHESIA (OUTPATIENT)
Dept: SURGERY | Age: 55
End: 2020-01-13
Payer: OTHER GOVERNMENT

## 2020-01-13 ENCOUNTER — HOSPITAL ENCOUNTER (OUTPATIENT)
Age: 55
Setting detail: OUTPATIENT SURGERY
Discharge: HOME | End: 2020-01-13
Attending: PODIATRIST | Admitting: PODIATRIST
Payer: OTHER GOVERNMENT

## 2020-01-13 VITALS
TEMPERATURE: 97 F | DIASTOLIC BLOOD PRESSURE: 72 MMHG | WEIGHT: 130 LBS | HEIGHT: 63 IN | OXYGEN SATURATION: 99 % | RESPIRATION RATE: 13 BRPM | SYSTOLIC BLOOD PRESSURE: 107 MMHG | BODY MASS INDEX: 23.04 KG/M2 | HEART RATE: 64 BPM

## 2020-01-13 DIAGNOSIS — M79.672 LEFT FOOT PAIN: Primary | ICD-10-CM

## 2020-01-13 PROCEDURE — 74011000250 HC RX REV CODE- 250: Performed by: PODIATRIST

## 2020-01-13 PROCEDURE — 74011250636 HC RX REV CODE- 250/636: Performed by: PODIATRIST

## 2020-01-13 PROCEDURE — 77030040922 HC BLNKT HYPOTHRM STRY -A: Performed by: PODIATRIST

## 2020-01-13 PROCEDURE — 74011250636 HC RX REV CODE- 250/636: Performed by: NURSE ANESTHETIST, CERTIFIED REGISTERED

## 2020-01-13 PROCEDURE — 76210000063 HC OR PH I REC FIRST 0.5 HR: Performed by: PODIATRIST

## 2020-01-13 PROCEDURE — 76060000035 HC ANESTHESIA 2 TO 2.5 HR: Performed by: PODIATRIST

## 2020-01-13 PROCEDURE — 77030006773 HC BLD SAW OSC BRSM -A: Performed by: PODIATRIST

## 2020-01-13 PROCEDURE — 88305 TISSUE EXAM BY PATHOLOGIST: CPT

## 2020-01-13 PROCEDURE — 77030002916 HC SUT ETHLN J&J -A: Performed by: PODIATRIST

## 2020-01-13 PROCEDURE — 76010000131 HC OR TIME 2 TO 2.5 HR: Performed by: PODIATRIST

## 2020-01-13 PROCEDURE — 74011250637 HC RX REV CODE- 250/637

## 2020-01-13 PROCEDURE — 77030040361 HC SLV COMPR DVT MDII -B: Performed by: PODIATRIST

## 2020-01-13 PROCEDURE — 76210000020 HC REC RM PH II FIRST 0.5 HR: Performed by: PODIATRIST

## 2020-01-13 PROCEDURE — 77030031139 HC SUT VCRL2 J&J -A: Performed by: PODIATRIST

## 2020-01-13 PROCEDURE — 74011000250 HC RX REV CODE- 250: Performed by: NURSE ANESTHETIST, CERTIFIED REGISTERED

## 2020-01-13 PROCEDURE — 77030018836 HC SOL IRR NACL ICUM -A: Performed by: PODIATRIST

## 2020-01-13 RX ORDER — SODIUM CHLORIDE, SODIUM LACTATE, POTASSIUM CHLORIDE, CALCIUM CHLORIDE 600; 310; 30; 20 MG/100ML; MG/100ML; MG/100ML; MG/100ML
75 INJECTION, SOLUTION INTRAVENOUS CONTINUOUS
Status: DISCONTINUED | OUTPATIENT
Start: 2020-01-13 | End: 2020-01-13 | Stop reason: HOSPADM

## 2020-01-13 RX ORDER — BUPIVACAINE HYDROCHLORIDE 5 MG/ML
INJECTION, SOLUTION EPIDURAL; INTRACAUDAL AS NEEDED
Status: DISCONTINUED | OUTPATIENT
Start: 2020-01-13 | End: 2020-01-13 | Stop reason: HOSPADM

## 2020-01-13 RX ORDER — KETOROLAC TROMETHAMINE 15 MG/ML
INJECTION, SOLUTION INTRAMUSCULAR; INTRAVENOUS AS NEEDED
Status: DISCONTINUED | OUTPATIENT
Start: 2020-01-13 | End: 2020-01-13 | Stop reason: HOSPADM

## 2020-01-13 RX ORDER — ONDANSETRON 2 MG/ML
4 INJECTION INTRAMUSCULAR; INTRAVENOUS ONCE
Status: DISCONTINUED | OUTPATIENT
Start: 2020-01-13 | End: 2020-01-13 | Stop reason: HOSPADM

## 2020-01-13 RX ORDER — PROPOFOL 10 MG/ML
INJECTION, EMULSION INTRAVENOUS AS NEEDED
Status: DISCONTINUED | OUTPATIENT
Start: 2020-01-13 | End: 2020-01-13 | Stop reason: HOSPADM

## 2020-01-13 RX ORDER — CEFAZOLIN SODIUM 2 G/50ML
2 SOLUTION INTRAVENOUS ONCE
Status: COMPLETED | OUTPATIENT
Start: 2020-01-13 | End: 2020-01-13

## 2020-01-13 RX ORDER — HUMIDIFIER
EACH MISCELLANEOUS
Qty: 2 EACH | Refills: 0 | Status: SHIPPED | OUTPATIENT
Start: 2020-01-13

## 2020-01-13 RX ORDER — SODIUM CHLORIDE 0.9 % (FLUSH) 0.9 %
5-40 SYRINGE (ML) INJECTION AS NEEDED
Status: DISCONTINUED | OUTPATIENT
Start: 2020-01-13 | End: 2020-01-13 | Stop reason: HOSPADM

## 2020-01-13 RX ORDER — PROPOFOL 10 MG/ML
VIAL (ML) INTRAVENOUS
Status: DISCONTINUED | OUTPATIENT
Start: 2020-01-13 | End: 2020-01-13 | Stop reason: HOSPADM

## 2020-01-13 RX ORDER — OXYCODONE AND ACETAMINOPHEN 5; 325 MG/1; MG/1
1 TABLET ORAL
Qty: 21 TAB | Refills: 0 | Status: SHIPPED | OUTPATIENT
Start: 2020-01-13 | End: 2020-01-16

## 2020-01-13 RX ORDER — LIDOCAINE HYDROCHLORIDE 20 MG/ML
INJECTION, SOLUTION EPIDURAL; INFILTRATION; INTRACAUDAL; PERINEURAL AS NEEDED
Status: DISCONTINUED | OUTPATIENT
Start: 2020-01-13 | End: 2020-01-13 | Stop reason: HOSPADM

## 2020-01-13 RX ORDER — FAMOTIDINE 20 MG/1
TABLET, FILM COATED ORAL
Status: COMPLETED
Start: 2020-01-13 | End: 2020-01-13

## 2020-01-13 RX ORDER — FAMOTIDINE 20 MG/1
20 TABLET, FILM COATED ORAL ONCE
Status: COMPLETED | OUTPATIENT
Start: 2020-01-13 | End: 2020-01-13

## 2020-01-13 RX ORDER — MIDAZOLAM HYDROCHLORIDE 1 MG/ML
INJECTION, SOLUTION INTRAMUSCULAR; INTRAVENOUS AS NEEDED
Status: DISCONTINUED | OUTPATIENT
Start: 2020-01-13 | End: 2020-01-13 | Stop reason: HOSPADM

## 2020-01-13 RX ORDER — FENTANYL CITRATE 50 UG/ML
INJECTION, SOLUTION INTRAMUSCULAR; INTRAVENOUS AS NEEDED
Status: DISCONTINUED | OUTPATIENT
Start: 2020-01-13 | End: 2020-01-13 | Stop reason: HOSPADM

## 2020-01-13 RX ORDER — NEOMYCIN AND POLYMYXIN B SULFATES 40; 200000 MG/ML; [USP'U]/ML
SOLUTION IRRIGATION AS NEEDED
Status: DISCONTINUED | OUTPATIENT
Start: 2020-01-13 | End: 2020-01-13 | Stop reason: HOSPADM

## 2020-01-13 RX ORDER — SODIUM CHLORIDE 0.9 % (FLUSH) 0.9 %
5-40 SYRINGE (ML) INJECTION EVERY 8 HOURS
Status: DISCONTINUED | OUTPATIENT
Start: 2020-01-13 | End: 2020-01-13 | Stop reason: HOSPADM

## 2020-01-13 RX ORDER — LIDOCAINE HYDROCHLORIDE 10 MG/ML
0.1 INJECTION, SOLUTION EPIDURAL; INFILTRATION; INTRACAUDAL; PERINEURAL AS NEEDED
Status: DISCONTINUED | OUTPATIENT
Start: 2020-01-13 | End: 2020-01-13 | Stop reason: HOSPADM

## 2020-01-13 RX ORDER — DEXAMETHASONE SODIUM PHOSPHATE 4 MG/ML
INJECTION, SOLUTION INTRA-ARTICULAR; INTRALESIONAL; INTRAMUSCULAR; INTRAVENOUS; SOFT TISSUE AS NEEDED
Status: DISCONTINUED | OUTPATIENT
Start: 2020-01-13 | End: 2020-01-13 | Stop reason: HOSPADM

## 2020-01-13 RX ORDER — DIPHENHYDRAMINE HYDROCHLORIDE 50 MG/ML
12.5 INJECTION, SOLUTION INTRAMUSCULAR; INTRAVENOUS
Status: DISCONTINUED | OUTPATIENT
Start: 2020-01-13 | End: 2020-01-13 | Stop reason: HOSPADM

## 2020-01-13 RX ORDER — FENTANYL CITRATE 50 UG/ML
25 INJECTION, SOLUTION INTRAMUSCULAR; INTRAVENOUS AS NEEDED
Status: DISCONTINUED | OUTPATIENT
Start: 2020-01-13 | End: 2020-01-13 | Stop reason: HOSPADM

## 2020-01-13 RX ORDER — NALBUPHINE HYDROCHLORIDE 10 MG/ML
5 INJECTION, SOLUTION INTRAMUSCULAR; INTRAVENOUS; SUBCUTANEOUS
Status: DISCONTINUED | OUTPATIENT
Start: 2020-01-13 | End: 2020-01-13 | Stop reason: HOSPADM

## 2020-01-13 RX ADMIN — FENTANYL CITRATE 50 MCG: 50 INJECTION INTRAMUSCULAR; INTRAVENOUS at 08:01

## 2020-01-13 RX ADMIN — KETOROLAC TROMETHAMINE 15 MG: 15 INJECTION, SOLUTION INTRAMUSCULAR; INTRAVENOUS at 08:48

## 2020-01-13 RX ADMIN — LIDOCAINE HYDROCHLORIDE 20 MG: 20 INJECTION, SOLUTION EPIDURAL; INFILTRATION; INTRACAUDAL; PERINEURAL at 07:37

## 2020-01-13 RX ADMIN — PROPOFOL 100 MCG/KG/MIN: 10 INJECTION, EMULSION INTRAVENOUS at 07:37

## 2020-01-13 RX ADMIN — FAMOTIDINE 20 MG: 20 TABLET, FILM COATED ORAL at 06:40

## 2020-01-13 RX ADMIN — FENTANYL CITRATE 50 MCG: 50 INJECTION INTRAMUSCULAR; INTRAVENOUS at 07:59

## 2020-01-13 RX ADMIN — MIDAZOLAM HYDROCHLORIDE 2 MG: 2 INJECTION, SOLUTION INTRAMUSCULAR; INTRAVENOUS at 07:30

## 2020-01-13 RX ADMIN — PROPOFOL 50 MG: 10 INJECTION, EMULSION INTRAVENOUS at 07:40

## 2020-01-13 RX ADMIN — CEFAZOLIN 2 G: 10 INJECTION, POWDER, FOR SOLUTION INTRAVENOUS at 07:30

## 2020-01-13 RX ADMIN — SODIUM CHLORIDE, SODIUM LACTATE, POTASSIUM CHLORIDE, AND CALCIUM CHLORIDE 75 ML/HR: 600; 310; 30; 20 INJECTION, SOLUTION INTRAVENOUS at 06:40

## 2020-01-13 NOTE — DISCHARGE INSTRUCTIONS
INSTRUCTIONS FOLLOWING FOOT SURGERY    ACTIVITY:  · Elevate feet for 48 hours  · Use ice as directed by your doctor  · Use crutches as directed by your doctor    DIET:  · Clear liquids until no nausea or vomiting; then light diet for the first day  · An advance to regular diet On second day, unless your doctor orders otherwise. PAIN:  · Take pain medication ouster acted by your doctor. · Call your doctor if pain is not relieved by medication. · Do not take aspirin or blood thinners until directed by your doctor. DRESSING CARE: ***       FOLLOW-UP PHONE CALLS:  · Calls will be made by nursing staff. · If you had any problems, call your doctor as needed. CALL YOUR DOCTOR IF:  · Excessive bleeding that does not stop after holding mild pressure over the area  · Temperature of 101° F or above  · Redness, excessive swelling or bruising, and/or green or yellow, smelly discharge from incision  · Loss of sensation -cold, white, or blue toes    AFTER ANESTHESIA :   · For the first 24 hours: do not drive, drink alcoholic beverages, or make important decisions. · Be aware of dizziness following anesthesia and while taking pain medication. DISCHARGE SUMMARY from Nurse    PATIENT INSTRUCTIONS:    After general anesthesia or intravenous sedation, for 24 hours or while taking prescription Narcotics:  · Limit your activities  · Do not drive and operate hazardous machinery  · Do not make important personal or business decisions  · Do  not drink alcoholic beverages  · If you have not urinated within 8 hours after discharge, please contact your surgeon on call.     Report the following to your surgeon:  · Excessive pain, swelling, redness or odor of or around the surgical area  · Temperature over 100.5  · Nausea and vomiting lasting longer than 4 hours or if unable to take medications  · Any signs of decreased circulation or nerve impairment to extremity: change in color, persistent  numbness, tingling, coldness or increase pain  · Any questions    What to do at Home:  Recommended activity: Activity as tolerated and no driving for today. *  Please give a list of your current medications to your Primary Care Provider. *  Please update this list whenever your medications are discontinued, doses are      changed, or new medications (including over-the-counter products) are added. *  Please carry medication information at all times in case of emergency situations. These are general instructions for a healthy lifestyle:    No smoking/ No tobacco products/ Avoid exposure to second hand smoke  Surgeon General's Warning:  Quitting smoking now greatly reduces serious risk to your health. Obesity, smoking, and sedentary lifestyle greatly increases your risk for illness    A healthy diet, regular physical exercise & weight monitoring are important for maintaining a healthy lifestyle    You may be retaining fluid if you have a history of heart failure or if you experience any of the following symptoms:  Weight gain of 3 pounds or more overnight or 5 pounds in a week, increased swelling in our hands or feet or shortness of breath while lying flat in bed. Please call your doctor as soon as you notice any of these symptoms; do not wait until your next office visit. The discharge information has been reviewed with the patient and family. The patient and family verbalized understanding. Discharge medications reviewed with the patient and family and appropriate educational materials and side effects teaching were provided.   ___________________________________________________________________________________________________________________________________

## 2020-01-13 NOTE — BRIEF OP NOTE
BRIEF OPERATIVE NOTE    Date of Procedure: 1/13/2020   Preoperative Diagnosis: G57.62, M20.42  Postoperative Diagnosis: G57.62, M20.42    Procedure(s):  REMOVAL BONE AND JOINT/FUSION WITH METAL PIN FIXATION/REMOVAL TISSUE AND NERVE/CUTTING AND RELOCATION GREAT TOE WITH METAL PIN FIXATION ALL LEFT FOOT/C-ARM  Surgeon(s) and Role:     Essie Jensen DPM - Primary         Surgical Assistant: lory    Surgical Staff:  Circ-1: Namrata Pereira RN  Scrub Tech-1: Fatoumata Urena  Surg Asst-1: Edna Fuentes  Event Time In Time Out   Incision Start 8837    Incision Close       Anesthesia: MAC   Estimated Blood Loss: 0  Specimens:   ID Type Source Tests Collected by Time Destination   1 : Neuroma LEFT foot Preservative Foot, left  Sharmila Aponte [facility] 1/13/2020 0830 Pathology      Findings: Albesa Danger   Complications: none  Implants: * No implants in log *

## 2020-01-13 NOTE — H&P
H&P Update:  Karma Zepeda was seen and examined. History and physical has been reviewed. The patient has been examined.  There have been no significant clinical changes since the completion of the originally dated History and Physical.

## 2020-01-13 NOTE — ANESTHESIA PREPROCEDURE EVALUATION
Anesthetic History   No history of anesthetic complications            Review of Systems / Medical History  Patient summary reviewed and pertinent labs reviewed    Pulmonary                Comments: Ex smoker   Neuro/Psych         Psychiatric history    Comments: Left arm weakness Cardiovascular  Within defined limits                Exercise tolerance: >4 METS     GI/Hepatic/Renal  Within defined limits              Endo/Other        Arthritis     Other Findings   Comments: Documentation of current medication  Current medications obtained, documented and obtained? YES      Risk Factors for Postoperative nausea/vomiting:       History of postoperative nausea/vomiting? NO       Female? YES       Motion sickness? NO       Intended opioid administration for postoperative analgesia? NO      Smoking Abstinence:  Current Smoker? NO  Elective Surgery? YES  Seen preoperatively by anesthesiologist or proxy prior to day of surgery? YES  Pt abstained from smoking 24 hours prior to anesthesia?  YES    Preventive care/screening for High Blood Pressure:  Aged 18 years and older: YES  Screened for high blood pressure: YES  Patients with high blood pressure referred to primary care provider   for BP management: YES                 Physical Exam    Airway  Mallampati: II  TM Distance: > 6 cm  Neck ROM: decreased range of motion   Mouth opening: Normal     Cardiovascular  Regular rate and rhythm,  S1 and S2 normal,  no murmur, click, rub, or gallop             Dental    Dentition: Upper partial plate     Pulmonary  Breath sounds clear to auscultation               Abdominal  GI exam deferred       Other Findings            Anesthetic Plan    ASA: 2  Anesthesia type: general and general - backup      Post-op pain plan if not by surgeon: IV PCA    Induction: Intravenous  Anesthetic plan and risks discussed with: Patient

## 2020-01-13 NOTE — PROGRESS NOTES
conducted a pre-surgery visit with Corey Yefri, who is a 62 y.o.,female. The  provided the following Interventions:  Initiated a relationship of care and support. Plan:  Chaplains will continue to follow and will provide pastoral care on an as needed/requested basis.  recommends bedside caregivers page  on duty if patient shows signs of acute spiritual or emotional distress.     [address]  [phone]

## 2020-01-13 NOTE — ANESTHESIA POSTPROCEDURE EVALUATION
Procedure(s):  REMOVAL BONE AND JOINT/FUSION WITH METAL PIN FIXATION/REMOVAL TISSUE AND NERVE/CUTTING AND RELOCATION GREAT TOE WITH METAL PIN FIXATION ALL LEFT FOOT/C-ARM. general    Anesthesia Post Evaluation      Multimodal analgesia: multimodal analgesia used between 6 hours prior to anesthesia start to PACU discharge  Patient location during evaluation: bedside  Patient participation: complete - patient participated  Level of consciousness: awake  Pain management: adequate  Airway patency: patent  Anesthetic complications: no  Cardiovascular status: stable  Respiratory status: acceptable  Hydration status: acceptable  Post anesthesia nausea and vomiting:  controlled      Vitals Value Taken Time   /77 1/13/2020  9:54 AM   Temp 36.5 °C (97.7 °F) 1/13/2020  9:34 AM   Pulse 68 1/13/2020  9:59 AM   Resp 9 1/13/2020  9:59 AM   SpO2 100 % 1/13/2020  9:59 AM   Vitals shown include unvalidated device data.

## 2020-01-14 NOTE — OP NOTES
[address] [place] [address]   OPERATIVE REPORT    Name:  Alberto Koehler  MR#:   [id number]  :  1965  ACCOUNT #:  [de-identified]  DATE OF SERVICE:  2020    PREOPERATIVE DIAGNOSES:  Hammertoe deformity, recurrent, second and third toes; neuroma, second intermetatarsal space; hallux abductus; all left foot. POSTOPERATIVE DIAGNOSES:  Hammertoe deformity, recurrent, second and third toes; neuroma, second intermetatarsal space; hallux abductus; all left foot. PROCEDURES PERFORMED:  Corrective wedge osteotomy with internal fixation, great toe; arthrodesis, interphalangeal joint, second and third toes with internal fixation; removal of postsurgical neuroma, second intermetatarsal space; all left foot. SURGEON:  Anna Marie Reynolds DPM    ASSISTANT:  lory. ANESTHESIA:  MAC.    COMPLICATIONS:  none. SPECIMENS REMOVED:  bone. IMPLANTS:  none. ESTIMATED BLOOD LOSS:  0.    PROCEDURE:  On 2020, the patient was placed on the operating room table in supine position. After adequate induction of MAC anesthesia, the left lower extremity was prepped and draped in the usual sterile fashion. Under sterile ankle tourniquet hemostasis, attention was directed to the left foot. The second and third toes were contracted with enlargement of the proximal interphalangeal joint. There was metatarsophalangeal joint contracture. Hallux abductus was noted. Two similar toe incisions were accomplished, ellipsing out the skin on the dorsum of the hallux, and careful blunt dissection to the proximal phalanx head was accomplished, preserving neurovascular structures. The extensor tendon was bluntly retracted out of harm's way. A wedge osteotomy was performed keeping intact the lateral cortex, and the great toe was adducted and fixated with a 0.045 K-wire. The wound was thoroughly irrigated, closed with Prolene suture.   Attention was directed to the second and third toes where two similar toe incisions were accomplished, ellipsing out redundant tissue, continued in a curvilinear fashion over the second metatarsophalangeal joint and second intermetatarsal space from the second toe. The proximal phalanx heads were exposed. There was hypertrophy and scar tissue which was excised, and they were prepared for peg-in-hole arthrodesis, carefully preserving the medial and lateral neurovascular structures. Dissection was carried to the second metatarsophalangeal joint, which was sharply released with McGlamry elevator and extensor tenotomy. Continuing into the interspace with blunt dissection and small laminar , scarred up and enlarged common digital nerve extending to the third and fourth toes was noticed, and was carefully freed through blunt dissection, and resected and sent for pathology. The interspace was irrigated with Marcaine and Decadron. The third toe was carefully prepared for peg-in-hole arthrodesis, preserving the interphalangeal joint and extensor tendon was released. The wounds were thoroughly irrigated. Retrograde 0.045 K-wires with reapproximation of the peg-in-hole arthrodesis for the second and third toes was accomplished centered across the metatarsophalangeal joint of the second toe. Deep closure was with 3-0 Vicryl. Skin was approximated with 4-0 and 5-0 Prolene. A Betadine compressive dressing was applied. The patient tolerated the procedure and anesthesia well with vital signs stable throughout. The patient was transported to the recovery room in stable condition.       Cindy John DPM [id number]  D:  01/13/2020 9:13  T:  01/14/2020 2:57  JOB #:  [id number]  CC:  Bryson Kim DPM

## 2020-01-27 ENCOUNTER — HOSPITAL ENCOUNTER (OUTPATIENT)
Dept: MAMMOGRAPHY | Age: 55
Discharge: HOME | End: 2020-01-27
Payer: OTHER GOVERNMENT

## 2020-01-27 DIAGNOSIS — Z12.31 VISIT FOR SCREENING MAMMOGRAM: ICD-10-CM

## 2020-01-27 PROCEDURE — 77067 SCR MAMMO BI INCL CAD: CPT

## 2021-01-29 ENCOUNTER — HOSPITAL ENCOUNTER (OUTPATIENT)
Dept: MAMMOGRAPHY | Age: 56
Discharge: HOME | End: 2021-01-29
Payer: OTHER GOVERNMENT

## 2021-01-29 DIAGNOSIS — Z12.31 VISIT FOR SCREENING MAMMOGRAM: ICD-10-CM

## 2021-01-29 PROCEDURE — 77063 BREAST TOMOSYNTHESIS BI: CPT

## 2022-03-18 PROBLEM — M17.9 KNEE OSTEOARTHRITIS: Status: ACTIVE | Noted: 2017-11-10

## 2023-02-28 ENCOUNTER — TRANSCRIBE ORDERS (OUTPATIENT)
Facility: HOSPITAL | Age: 58
End: 2023-02-28

## 2023-02-28 DIAGNOSIS — Z12.31 VISIT FOR SCREENING MAMMOGRAM: Primary | ICD-10-CM

## 2023-04-03 ENCOUNTER — HOSPITAL ENCOUNTER (OUTPATIENT)
Dept: WOMENS IMAGING | Facility: HOSPITAL | Age: 58
Discharge: HOME | End: 2023-04-06
Payer: OTHER GOVERNMENT

## 2023-04-03 DIAGNOSIS — Z12.31 VISIT FOR SCREENING MAMMOGRAM: ICD-10-CM

## 2023-04-03 PROCEDURE — 77063 BREAST TOMOSYNTHESIS BI: CPT

## 2023-08-10 PROBLEM — K63.89 MASS OF COLON: Status: ACTIVE | Noted: 2023-08-10

## 2024-04-16 ENCOUNTER — HOSPITAL ENCOUNTER (OUTPATIENT)
Dept: WOMENS IMAGING | Facility: HOSPITAL | Age: 59
Discharge: HOME | End: 2024-04-19
Payer: OTHER GOVERNMENT

## 2024-04-16 VITALS — WEIGHT: 140 LBS | BODY MASS INDEX: 25.61 KG/M2

## 2024-04-16 DIAGNOSIS — Z12.31 VISIT FOR SCREENING MAMMOGRAM: ICD-10-CM

## 2024-04-16 PROCEDURE — 77063 BREAST TOMOSYNTHESIS BI: CPT

## (undated) DEVICE — T4 HOOD

## (undated) DEVICE — SUTURE ABSORBABLE BRAIDED 4-0 P-3 18 IN UD VICRYL J494G

## (undated) DEVICE — BANDAGE COMPR EXSANGUATION SGL LAYERED NO CLSR 9FT LEN 4IN W

## (undated) DEVICE — PACK PROCEDURE SURG MAJ W/ BASIN LF

## (undated) DEVICE — SHOE POSTOP M MAN 9-11 UNIV FOAM TRICOT SEMI FLX SKID

## (undated) DEVICE — INTENDED FOR TISSUE SEPARATION, AND OTHER PROCEDURES THAT REQUIRE A SHARP SURGICAL BLADE TO PUNCTURE OR CUT.: Brand: BARD-PARKER SAFETY BLADES SIZE 10, STERILE

## (undated) DEVICE — BLADE ASSEMB CLP HAIR FINE --

## (undated) DEVICE — BNDG ELAS COBAN 3INX5YD NS --

## (undated) DEVICE — GOWN,REINFORCED,POLY,AURORA,XLARGE,STRL: Brand: MEDLINE

## (undated) DEVICE — GAUZE,SPONGE,4"X4",16PLY,STRL,LF,10/TRAY: Brand: MEDLINE

## (undated) DEVICE — STOCKING ANTIEMB KNEE SM REG --

## (undated) DEVICE — CURITY NON-ADHERENT STRIPS: Brand: CURITY

## (undated) DEVICE — GAUZE SPONGES,16 PLY: Brand: CURITY

## (undated) DEVICE — NEEDLE HYPO 18GA L1.5IN PNK S STL HUB POLYPR SHLD REG BVL

## (undated) DEVICE — Device

## (undated) DEVICE — DRAPE,EXTREMITY,89X128,STERILE: Brand: MEDLINE

## (undated) DEVICE — KERLIX BANDAGE ROLL: Brand: KERLIX

## (undated) DEVICE — SUTURE MCRYL SZ 3 0 L18IN ABSRB VLT CT 1 L36MM 1 2 CIR TAPR Y738D

## (undated) DEVICE — 3M™ STERI-STRIP™ COMPOUND BENZOIN TINCTURE 40 BAGS/CARTON 4 CARTONS/CASE C1544: Brand: 3M™ STERI-STRIP™

## (undated) DEVICE — DRAIN KT WND 10FR RND 400ML --

## (undated) DEVICE — SUTURE VCRL SZ 5-0 L18IN ABSRB UD P-3 L13MM 3/8 CIR PRIM J493H

## (undated) DEVICE — 3M™ STERI-STRIP™ REINFORCED ADHESIVE SKIN CLOSURES, R1546, 1/4 IN X 4 IN (6 MM X 100 MM), 10 STRIPS/ENVELOPE: Brand: 3M™ STERI-STRIP™

## (undated) DEVICE — DERMACEA GAUZE ROLL: Brand: DERMACEA

## (undated) DEVICE — SUTURE VCRL SZ 2-0 L27IN ABSRB VLT L26MM CT-2 1/2 CIR J333H

## (undated) DEVICE — 3M™ TEGADERM™ TRANSPARENT FILM DRESSING FRAME STYLE, 1626W, 4 IN X 4-3/4 IN (10 CM X 12 CM), 50/CT 4CT/CASE: Brand: 3M™ TEGADERM™

## (undated) DEVICE — GUIDEPIN ORTH THRD HI PERF HD SIG

## (undated) DEVICE — Z DISCONTINUED BY MEDLINE USE 2711682 TRAY SKIN PREP DRY W/ PREM GLV

## (undated) DEVICE — ZIMMER® STERILE DISPOSABLE TOURNIQUET CUFF WITH PROTECTIVE SLEEVE AND PLC, DUAL PORT, SINGLE BLADDER, 34 IN. (86 CM)

## (undated) DEVICE — (D)GLOVE SURG ORTH 8 PWD LTX -- DISC BY MFR USE ITEM 278015

## (undated) DEVICE — SUTURE VCRL SZ 0 L27IN ABSRB UD L36MM CT-1 1/2 CIR J260H

## (undated) DEVICE — INTENDED FOR TISSUE SEPARATION, AND OTHER PROCEDURES THAT REQUIRE A SHARP SURGICAL BLADE TO PUNCTURE OR CUT.: Brand: BARD-PARKER SAFETY BLADES SIZE 15, STERILE

## (undated) DEVICE — NEEDLE HYPO 27GA L1.25IN GRY POLYPR HUB S STL REG BVL STR

## (undated) DEVICE — CUFF TRNQT AD W4IN 18IN CIRC SURG GEL SGL PRT BLDR PNEUMAT

## (undated) DEVICE — UNIT THER SEMI CLS LOOP RECIRC SYS W/ UNIV WRP ON PD ICEMAN

## (undated) DEVICE — BANDAGE COBAN 4 IN COMPR W4INXL5YD FOAM COHESIVE QUIK STK SELF ADH SFT

## (undated) DEVICE — SYSTEM SKIN CLSR 22CM DERMBND PRINEO

## (undated) DEVICE — SYR LR LCK 1ML GRAD NSAF 30ML --

## (undated) DEVICE — (D)PREP SKN CHLRAPRP APPL 26ML -- CONVERT TO ITEM 371833

## (undated) DEVICE — THREE-QUARTER SHEET: Brand: CONVERTORS

## (undated) DEVICE — GARMENT,MEDLINE,DVT,INT,CALF,MED, GEN2: Brand: MEDLINE

## (undated) DEVICE — WATERPROOF, BACTERIA PROOF DRESSING WITH ABSORBENT SEE THROUGH PAD: Brand: OPSITE POST-OP VISIBLE 25X10CM CTN 20

## (undated) DEVICE — BLADE RMFG DBL RECIP DBL SD --

## (undated) DEVICE — STERILE POLYISOPRENE POWDER-FREE SURGICAL GLOVES: Brand: PROTEXIS

## (undated) DEVICE — DRSG AQUACEL SURG 3.5 X 10IN -- CONVERT TO ITEM 370183

## (undated) DEVICE — ELASTIC BANDAGE 6": Brand: CARDINAL HEALTH

## (undated) DEVICE — BLADE SAW W5.5XL18MM THK0.38MM CUT THK0.43MM REPL S STL SAG

## (undated) DEVICE — KIT CLN UP BON SECOURS MARYV

## (undated) DEVICE — 3M™ BAIR PAWS FLEX™ WARMING GOWN, STANDARD, 20 PER CASE 81003: Brand: BAIR PAWS™

## (undated) DEVICE — SUTURE ETHLN SZ 4-0 L18IN NONABSORBABLE BLK L19MM PC-5 3/8 1894G

## (undated) DEVICE — SINGLE USE DEVICE INTENDED TO COVER EXPOSED ENDS OF ORTHOPEDIC PIN AND K-WIRES TO HELP PROTECT THE EXPOSED WIRE FROM SNAGGING ON CLOTHING.: Brand: OXBORO™ PIN COVER

## (undated) DEVICE — SUTURE STRATAFIX SPRL SZ 1 L14IN ABSRB VLT L48CM CTX 1/2 SXPD2B405

## (undated) DEVICE — 3M™ WARMING BLANKET, UPPER BODY, 10 PER CASE, 42268: Brand: BAIR HUGGER™

## (undated) DEVICE — FLEX ADVANTAGE 3000CC: Brand: FLEX ADVANTAGE

## (undated) DEVICE — SUTURE VCRL SZ 3-0 L27IN ABSRB VLT CT-2 L26MM 1/2 CIR J332H

## (undated) DEVICE — SOFT SILICONE HYDROCELLULAR SACRUM DRESSING WITH LOCK AWAY LAYER: Brand: ALLEVYN LIFE SACRUM (LARGE) PACK OF 10

## (undated) DEVICE — PAD COOL W10.9XL11.3IN UNIV REG HOSE WRP ON THER CLD

## (undated) DEVICE — REM POLYHESIVE ADULT PATIENT RETURN ELECTRODE: Brand: VALLEYLAB

## (undated) DEVICE — SOLUTION IV 1000ML 0.9% SOD CHL

## (undated) DEVICE — BANDAGE,GAUZE,BULKEE LITE,3"X4.1YD,STRL: Brand: MEDLINE

## (undated) DEVICE — SHOE POSTOP M WOMAN 6-8 UNIV FOAM TRICOT SEMI FLX SKID

## (undated) DEVICE — STRIP SKIN CLSR W0.25XL4IN WHT SPUNBOUND FBR NYL HI ADH

## (undated) DEVICE — BLADE SAW W5.5XL11.5MM THK0.38MM CUT THK0.43MM REPL S STL

## (undated) DEVICE — SUTURE MCRYL SZ 4-0 L27IN ABSRB UD L24MM PS-1 3/8 CIR PRIM Y935H

## (undated) DEVICE — HANDPIECE SET WITH HIGH FLOW TIP AND SUCTION TUBE: Brand: INTERPULSE

## (undated) DEVICE — PACK SURG BSHR TOT KNEE LF

## (undated) DEVICE — BLANKET WRM AD W50XL85.8IN PACU FULL BODY FORC AIR

## (undated) DEVICE — PADDING CAST W6INXL4YD ST COT COHESIVE HND TEARABLE SPEC

## (undated) DEVICE — TRAY CATH OD16FR SIL URIN M STATLOK STBL DEV SURSTP

## (undated) DEVICE — TABLE COVER: Brand: CONVERTORS

## (undated) DEVICE — NEEDLE NRV BLK 21GA L4IN 30DEG INSUL BVL EXTN SET STIMUPLEX

## (undated) DEVICE — KENDALL SCD EXPRESS SLEEVES, KNEE LENGTH, MEDIUM: Brand: KENDALL SCD

## (undated) DEVICE — PIN DRL QUIK HI PERF FOR SIG SYS

## (undated) DEVICE — SPONGE LAP 18X18IN STRL -- 5/PK

## (undated) DEVICE — SYR 10ML LUER LOK 1/5ML GRAD --

## (undated) DEVICE — RECIPROCATING BLADE, DOUBLE SIDED, OFFSET  (70.0 X 0.64 X 12.6MM)

## (undated) DEVICE — BOWL AND CEMENT CARTRIDGE WITH BREAKAWAY FEMORAL NOZZLE: Brand: ACM

## (undated) DEVICE — GAUZE,SPONGE,4"X4",12PLY,STERILE,LF,2'S: Brand: MEDLINE

## (undated) DEVICE — SOLUTION IRRIG 3000ML 0.9% SOD CHL FLX CONT 0797208] ICU MEDICAL INC]

## (undated) DEVICE — 4-PORT MANIFOLD: Brand: NEPTUNE 2